# Patient Record
Sex: MALE | Race: WHITE | Employment: OTHER | URBAN - METROPOLITAN AREA
[De-identification: names, ages, dates, MRNs, and addresses within clinical notes are randomized per-mention and may not be internally consistent; named-entity substitution may affect disease eponyms.]

---

## 2017-06-19 ENCOUNTER — OFFICE VISIT (OUTPATIENT)
Dept: DERMATOLOGY CLINIC | Facility: CLINIC | Age: 67
End: 2017-06-19

## 2017-06-19 DIAGNOSIS — L30.4 INTERTRIGO: ICD-10-CM

## 2017-06-19 DIAGNOSIS — D23.60 BENIGN NEOPLASM OF SKIN OF UPPER LIMB, INCLUDING SHOULDER, UNSPECIFIED LATERALITY: ICD-10-CM

## 2017-06-19 DIAGNOSIS — Z87.2 HISTORY OF ACTINIC KERATOSES: ICD-10-CM

## 2017-06-19 DIAGNOSIS — D23.5 BENIGN NEOPLASM OF SKIN OF TRUNK, EXCEPT SCROTUM: ICD-10-CM

## 2017-06-19 DIAGNOSIS — D23.70 BENIGN NEOPLASM OF SKIN OF LOWER LIMB, INCLUDING HIP, UNSPECIFIED LATERALITY: ICD-10-CM

## 2017-06-19 DIAGNOSIS — L81.4 SOLAR LENTIGO: ICD-10-CM

## 2017-06-19 DIAGNOSIS — L82.1 SEBORRHEIC KERATOSES: Primary | ICD-10-CM

## 2017-06-19 DIAGNOSIS — D23.4 BENIGN NEOPLASM OF SCALP AND SKIN OF NECK: ICD-10-CM

## 2017-06-19 DIAGNOSIS — D23.30 BENIGN NEOPLASM OF SKIN OF FACE: ICD-10-CM

## 2017-06-19 DIAGNOSIS — Z86.018 HISTORY OF DYSPLASTIC NEVUS: ICD-10-CM

## 2017-06-19 PROCEDURE — 99213 OFFICE O/P EST LOW 20 MIN: CPT | Performed by: DERMATOLOGY

## 2017-06-19 PROCEDURE — 99212 OFFICE O/P EST SF 10 MIN: CPT | Performed by: DERMATOLOGY

## 2017-06-19 NOTE — PROGRESS NOTES
HPI:     Chief Complaint     Derm Problem        HPI     Derm Problem    Additional comments: concerned about multiple lesions requests full body checj, history of AK       Last edited by Aviva Judge RN on 6/19/2017  8:08 AM. (History)         patient a Past Surgical History    COLONOSCOPY,DIAGNOSTIC  '05    SPECIAL SERVICE OR REPORT      Comment kidney stone removal    COLONOSCOPY         Social History   Marital Status:   Spouse Name: N/A    Years of Education: N/A  Number of Children: N/ hydrocortisone cream to be applied 1 tab the other twice a day as needed only  History of dysplastic nevus-patient will continue yearly follow-up  History of actinic keratoses-patient will continue yearly follow-up  Benign neoplasm of skin of trunk, except

## 2017-06-19 NOTE — PROGRESS NOTES
Past Medical History   Diagnosis Date   • Family history of unspecified malignant neoplasm      prostate   • Duodenal ulcer 1/05   • Cellulitis and abscess of upper arm and forearm      right   • Other and unspecified hyperlipidemia    • Calculus of kidney

## 2018-03-21 RX ORDER — IBUPROFEN 200 MG
200 TABLET ORAL EVERY 6 HOURS PRN
Status: ON HOLD | COMMUNITY
End: 2019-11-10

## 2018-03-27 ENCOUNTER — ANESTHESIA EVENT (OUTPATIENT)
Dept: SURGERY | Facility: HOSPITAL | Age: 68
End: 2018-03-27
Payer: COMMERCIAL

## 2018-03-27 ENCOUNTER — HOSPITAL ENCOUNTER (OUTPATIENT)
Facility: HOSPITAL | Age: 68
Setting detail: HOSPITAL OUTPATIENT SURGERY
Discharge: HOME | End: 2018-03-27
Attending: SURGERY | Admitting: SURGERY
Payer: COMMERCIAL

## 2018-03-27 ENCOUNTER — ANESTHESIA (OUTPATIENT)
Dept: SURGERY | Facility: HOSPITAL | Age: 68
End: 2018-03-27
Payer: COMMERCIAL

## 2018-03-27 ENCOUNTER — SURGERY (OUTPATIENT)
Age: 68
End: 2018-03-27

## 2018-03-27 VITALS
TEMPERATURE: 97 F | OXYGEN SATURATION: 95 % | HEART RATE: 61 BPM | DIASTOLIC BLOOD PRESSURE: 92 MMHG | RESPIRATION RATE: 18 BRPM | SYSTOLIC BLOOD PRESSURE: 144 MMHG | BODY MASS INDEX: 34.12 KG/M2 | WEIGHT: 230.38 LBS | HEIGHT: 69 IN

## 2018-03-27 PROBLEM — I83.899 SYMPTOMATIC VARICOSE VEINS: Status: ACTIVE | Noted: 2018-03-27

## 2018-03-27 PROCEDURE — 06DQ0ZZ EXTRACTION OF LEFT SAPHENOUS VEIN, OPEN APPROACH: ICD-10-PCS | Performed by: SURGERY

## 2018-03-27 PROCEDURE — 06DQ3ZZ EXTRACTION OF LEFT SAPHENOUS VEIN, PERCUTANEOUS APPROACH: ICD-10-PCS | Performed by: SURGERY

## 2018-03-27 RX ORDER — MORPHINE SULFATE 4 MG/ML
4 INJECTION, SOLUTION INTRAMUSCULAR; INTRAVENOUS EVERY 10 MIN PRN
Status: DISCONTINUED | OUTPATIENT
Start: 2018-03-27 | End: 2018-03-27

## 2018-03-27 RX ORDER — LIDOCAINE HYDROCHLORIDE 10 MG/ML
INJECTION, SOLUTION EPIDURAL; INFILTRATION; INTRACAUDAL; PERINEURAL AS NEEDED
Status: DISCONTINUED | OUTPATIENT
Start: 2018-03-27 | End: 2018-03-27 | Stop reason: SURG

## 2018-03-27 RX ORDER — HYDROCODONE BITARTRATE AND ACETAMINOPHEN 5; 325 MG/1; MG/1
1 TABLET ORAL AS NEEDED
Status: DISCONTINUED | OUTPATIENT
Start: 2018-03-27 | End: 2018-03-27

## 2018-03-27 RX ORDER — SODIUM CHLORIDE, SODIUM LACTATE, POTASSIUM CHLORIDE, CALCIUM CHLORIDE 600; 310; 30; 20 MG/100ML; MG/100ML; MG/100ML; MG/100ML
INJECTION, SOLUTION INTRAVENOUS CONTINUOUS
Status: DISCONTINUED | OUTPATIENT
Start: 2018-03-27 | End: 2018-03-27

## 2018-03-27 RX ORDER — ONDANSETRON 2 MG/ML
4 INJECTION INTRAMUSCULAR; INTRAVENOUS EVERY 6 HOURS PRN
Status: CANCELLED | OUTPATIENT
Start: 2018-03-27

## 2018-03-27 RX ORDER — MIDAZOLAM HYDROCHLORIDE 1 MG/ML
INJECTION INTRAMUSCULAR; INTRAVENOUS AS NEEDED
Status: DISCONTINUED | OUTPATIENT
Start: 2018-03-27 | End: 2018-03-27 | Stop reason: SURG

## 2018-03-27 RX ORDER — EPHEDRINE SULFATE 50 MG/ML
INJECTION, SOLUTION INTRAVENOUS AS NEEDED
Status: DISCONTINUED | OUTPATIENT
Start: 2018-03-27 | End: 2018-03-27 | Stop reason: SURG

## 2018-03-27 RX ORDER — METOCLOPRAMIDE 10 MG/1
10 TABLET ORAL ONCE
Status: COMPLETED | OUTPATIENT
Start: 2018-03-27 | End: 2018-03-27

## 2018-03-27 RX ORDER — HYDROCODONE BITARTRATE AND ACETAMINOPHEN 10; 325 MG/1; MG/1
1 TABLET ORAL EVERY 6 HOURS PRN
Qty: 20 TABLET | Refills: 0 | Status: SHIPPED | OUTPATIENT
Start: 2018-03-27 | End: 2018-04-01

## 2018-03-27 RX ORDER — ONDANSETRON 2 MG/ML
4 INJECTION INTRAMUSCULAR; INTRAVENOUS ONCE AS NEEDED
Status: DISCONTINUED | OUTPATIENT
Start: 2018-03-27 | End: 2018-03-27

## 2018-03-27 RX ORDER — MORPHINE SULFATE 10 MG/ML
6 INJECTION, SOLUTION INTRAMUSCULAR; INTRAVENOUS EVERY 10 MIN PRN
Status: DISCONTINUED | OUTPATIENT
Start: 2018-03-27 | End: 2018-03-27

## 2018-03-27 RX ORDER — NALOXONE HYDROCHLORIDE 0.4 MG/ML
80 INJECTION, SOLUTION INTRAMUSCULAR; INTRAVENOUS; SUBCUTANEOUS AS NEEDED
Status: DISCONTINUED | OUTPATIENT
Start: 2018-03-27 | End: 2018-03-27

## 2018-03-27 RX ORDER — BUPIVACAINE HYDROCHLORIDE 2.5 MG/ML
INJECTION, SOLUTION EPIDURAL; INFILTRATION; INTRACAUDAL AS NEEDED
Status: DISCONTINUED | OUTPATIENT
Start: 2018-03-27 | End: 2018-03-27 | Stop reason: HOSPADM

## 2018-03-27 RX ORDER — DEXAMETHASONE SODIUM PHOSPHATE 4 MG/ML
VIAL (ML) INJECTION AS NEEDED
Status: DISCONTINUED | OUTPATIENT
Start: 2018-03-27 | End: 2018-03-27 | Stop reason: SURG

## 2018-03-27 RX ORDER — HYDROCODONE BITARTRATE AND ACETAMINOPHEN 5; 325 MG/1; MG/1
2 TABLET ORAL AS NEEDED
Status: DISCONTINUED | OUTPATIENT
Start: 2018-03-27 | End: 2018-03-27

## 2018-03-27 RX ORDER — HALOPERIDOL 5 MG/ML
0.25 INJECTION INTRAMUSCULAR ONCE AS NEEDED
Status: DISCONTINUED | OUTPATIENT
Start: 2018-03-27 | End: 2018-03-27

## 2018-03-27 RX ORDER — ONDANSETRON 2 MG/ML
INJECTION INTRAMUSCULAR; INTRAVENOUS AS NEEDED
Status: DISCONTINUED | OUTPATIENT
Start: 2018-03-27 | End: 2018-03-27 | Stop reason: SURG

## 2018-03-27 RX ORDER — MORPHINE SULFATE 2 MG/ML
2 INJECTION, SOLUTION INTRAMUSCULAR; INTRAVENOUS EVERY 10 MIN PRN
Status: DISCONTINUED | OUTPATIENT
Start: 2018-03-27 | End: 2018-03-27

## 2018-03-27 RX ORDER — FAMOTIDINE 20 MG/1
20 TABLET ORAL ONCE
Status: COMPLETED | OUTPATIENT
Start: 2018-03-27 | End: 2018-03-27

## 2018-03-27 RX ORDER — ACETAMINOPHEN 500 MG
1000 TABLET ORAL ONCE
Status: COMPLETED | OUTPATIENT
Start: 2018-03-27 | End: 2018-03-27

## 2018-03-27 RX ADMIN — MIDAZOLAM HYDROCHLORIDE 2 MG: 1 INJECTION INTRAMUSCULAR; INTRAVENOUS at 08:05:00

## 2018-03-27 RX ADMIN — ONDANSETRON 4 MG: 2 INJECTION INTRAMUSCULAR; INTRAVENOUS at 08:05:00

## 2018-03-27 RX ADMIN — SODIUM CHLORIDE, SODIUM LACTATE, POTASSIUM CHLORIDE, CALCIUM CHLORIDE: 600; 310; 30; 20 INJECTION, SOLUTION INTRAVENOUS at 08:05:00

## 2018-03-27 RX ADMIN — EPHEDRINE SULFATE 10 MG: 50 INJECTION, SOLUTION INTRAVENOUS at 08:27:00

## 2018-03-27 RX ADMIN — DEXAMETHASONE SODIUM PHOSPHATE 4 MG: 4 MG/ML VIAL (ML) INJECTION at 08:05:00

## 2018-03-27 RX ADMIN — LIDOCAINE HYDROCHLORIDE 50 MG: 10 INJECTION, SOLUTION EPIDURAL; INFILTRATION; INTRACAUDAL; PERINEURAL at 08:05:00

## 2018-03-27 NOTE — OPERATIVE REPORT
[facility]    PATIENT'S NAME: Shanelle Lucero   ATTENDING PHYSICIAN: Jenna Haywood MD   OPERATING PHYSICIAN: Jenna Haywood MD   PATIENT ACCOUNT#:   [de-identified]    LOCATION:  [place] [address]  MEDICAL RECORD #:   [id number]       DATE entire leg was prepped with Betadine. Time-out was done. A 2 cm groin incision was made. The greater saphenous vein was identified at its origin; it was about 13 mm in diameter. It was doubly ligated and short segment resected between ligatures.   The regina removed. The patient brought to the recovery room in stable condition.     Dictated By Skylar Oswald MD  d: 03/27/2018 09:44:00  t: 03/27/2018 10:53:50  [facility] [id number]  [facility]/

## 2018-03-27 NOTE — BRIEF OP NOTE
[facility] POST ANESTHESIA CARE UNIT  Brief Op Note       Patients Name: Elsa Chaudhry  Attending Physician: Agata Deal MD  Operating Physician: Loretta Yanes MD  CSN: [id number]     Location:  OR  MRN: [id number]    YOB: 1950

## 2018-03-27 NOTE — ANESTHESIA POSTPROCEDURE EVALUATION
Patient: Samson Goldmann    Procedure Summary     Date:  03/27/18 Room / Location:  [address] [place] [address] MAIN OR [address] [place] [address] MAIN OR    Anesthesia Start:  0801 Anesthesia Stop:      Procedures:       VEIN LIGATION (Left )      STAB PHLEBECTOMY (Left ) Diagnosis:  (Left leg

## 2018-03-27 NOTE — ANESTHESIA PREPROCEDURE EVALUATION
Anesthesia PreOp Note    HPI:     Vikash Eckert is a 74year old male who presents for preoperative consultation requested by: Yaw Cheney MD    Date of Surgery: 3/27/2018    Procedure(s):  VEIN LIGATION  STAB PHLEBECTOMY  Indication: Left leg Varic MULTIVITAMIN OR daily Disp:  Rfl:  3/23/2018       Current Facility-Administered Medications Ordered in Epic:  lactated ringers infusion  Intravenous Continuous Kae Guerrero MD Last Rate: 20 mL/hr at 03/27/18 0704    Vancomycin HCl (VANCOCIN) 1,500 mg Neuro/Psych - negative ROS     GI/Hepatic/Renal - negative ROS     Comments: Renal calculus    Endo/Other - negative ROS   Abdominal              Anesthesia Plan:   ASA:  2  Plan:   General  Informed Consent Plan and Risks Discussed With:  Patient      I h

## 2018-03-27 NOTE — H&P
History & Physical Examination    Patient Name: Brianne Cisneros  MRN: [id number]  [facility]: [id number]  YOB: 1950    Diagnosis: Left leg Varicose veins with inflammation; venous insufficiency     History Present Illness: Patient is a 74 year ol MG/ML injection 50 mcg 50 mcg Intravenous Q5 Min PRN   morphINE sulfate (PF) 2 MG/ML injection 2 mg 2 mg Intravenous Q10 Min PRN   morphINE sulfate (PF) 4 MG/ML injection 4 mg 4 mg Intravenous Q10 Min PRN   morphINE sulfate (PF) 10 MG/ML injection 6 mg 6 m STRIPPING Right  Family History   Problem Relation Age of Onset   • Cancer Father      cancer, lung -smoker   • Hypertension Father    • Lipids Mother         Smoking status: Never Smoker    Smokeless tobacco: Never Used    Alcohol use No       SYSTEM Chec

## 2018-06-09 ENCOUNTER — APPOINTMENT (OUTPATIENT)
Dept: ULTRASOUND IMAGING | Facility: HOSPITAL | Age: 68
End: 2018-06-09
Attending: EMERGENCY MEDICINE
Payer: COMMERCIAL

## 2018-06-09 ENCOUNTER — HOSPITAL ENCOUNTER (EMERGENCY)
Facility: HOSPITAL | Age: 68
Discharge: HOME | End: 2018-06-09
Attending: EMERGENCY MEDICINE
Payer: COMMERCIAL

## 2018-06-09 VITALS
HEART RATE: 68 BPM | WEIGHT: 231.5 LBS | SYSTOLIC BLOOD PRESSURE: 141 MMHG | TEMPERATURE: 98 F | BODY MASS INDEX: 34.29 KG/M2 | HEIGHT: 69 IN | DIASTOLIC BLOOD PRESSURE: 80 MMHG | RESPIRATION RATE: 18 BRPM | OXYGEN SATURATION: 96 %

## 2018-06-09 DIAGNOSIS — M79.89 LEG SWELLING: ICD-10-CM

## 2018-06-09 DIAGNOSIS — M25.461 KNEE EFFUSION, RIGHT: Primary | ICD-10-CM

## 2018-06-09 PROCEDURE — 93971 EXTREMITY STUDY: CPT | Performed by: EMERGENCY MEDICINE

## 2018-06-09 PROCEDURE — 99284 EMERGENCY DEPT VISIT MOD MDM: CPT

## 2018-06-09 NOTE — ED PROVIDER NOTES
Patient Seen in: [facility] AND [facility] Emergency Department    History   Patient presents with:  Swelling Edema (cardiovascular, metabolic)      HPI    Patient presents to the ED complaining of swelling of his right lower leg.   He states that several days ag No  Reaction to local anes* No        ROS  Pertinent Positives: Leg swelling, knee swelling  All other organ systems are reviewed and are negative. Constitutional and vital signs reviewed.       Social History and Family History elements reviewed from to and interpreted all ED vitals.     Pulse Ox: 96%, Room air, Normal     Differential Diagnosis/ Diagnostic Considerations: DVT, knee effusion, knee sprain, peripheral edema    Medical Record Review: I personally reviewed available prior medical records for a

## 2018-06-09 NOTE — ED INITIAL ASSESSMENT (HPI)
Pt reports to ED with complaints of RLE swelling. Pt has bilat lower extremity pitting edema but more severe on RLE. Some swelling noted in R lower thigh above knee. Pt denies any pain or SOB.

## 2018-06-18 ENCOUNTER — OFFICE VISIT (OUTPATIENT)
Dept: DERMATOLOGY CLINIC | Facility: CLINIC | Age: 68
End: 2018-06-18

## 2018-06-18 ENCOUNTER — APPOINTMENT (OUTPATIENT)
Dept: LAB | Age: 68
End: 2018-06-18
Attending: DERMATOLOGY
Payer: COMMERCIAL

## 2018-06-18 DIAGNOSIS — D23.70 BENIGN NEOPLASM OF SKIN OF LOWER EXTREMITY, INCLUDING HIP, UNSPECIFIED LATERALITY: ICD-10-CM

## 2018-06-18 DIAGNOSIS — D23.60 BENIGN NEOPLASM OF SKIN OF UPPER EXTREMITY AND SHOULDER, UNSPECIFIED LATERALITY: ICD-10-CM

## 2018-06-18 DIAGNOSIS — L30.4 INTERTRIGO: ICD-10-CM

## 2018-06-18 DIAGNOSIS — D48.5 NEOPLASM OF UNCERTAIN BEHAVIOR OF SKIN: ICD-10-CM

## 2018-06-18 DIAGNOSIS — L30.9 DERMATITIS: Primary | ICD-10-CM

## 2018-06-18 DIAGNOSIS — D23.5 BENIGN NEOPLASM OF SKIN OF TRUNK, EXCEPT SCROTUM: ICD-10-CM

## 2018-06-18 DIAGNOSIS — Z86.018 HISTORY OF DYSPLASTIC NEVUS: ICD-10-CM

## 2018-06-18 DIAGNOSIS — D23.4 BENIGN NEOPLASM OF SCALP AND SKIN OF NECK: ICD-10-CM

## 2018-06-18 DIAGNOSIS — D23.30 BENIGN NEOPLASM OF SKIN OF FACE: ICD-10-CM

## 2018-06-18 PROCEDURE — 99213 OFFICE O/P EST LOW 20 MIN: CPT | Performed by: DERMATOLOGY

## 2018-06-18 PROCEDURE — 88305 TISSUE EXAM BY PATHOLOGIST: CPT | Performed by: DERMATOLOGY

## 2018-06-18 PROCEDURE — 36415 COLL VENOUS BLD VENIPUNCTURE: CPT | Performed by: DERMATOLOGY

## 2018-06-18 PROCEDURE — 86038 ANTINUCLEAR ANTIBODIES: CPT | Performed by: DERMATOLOGY

## 2018-06-18 PROCEDURE — 88342 IMHCHEM/IMCYTCHM 1ST ANTB: CPT | Performed by: DERMATOLOGY

## 2018-06-18 PROCEDURE — 11100 BIOPSY OF SKIN LESION: CPT | Performed by: DERMATOLOGY

## 2018-06-18 PROCEDURE — 99212 OFFICE O/P EST SF 10 MIN: CPT | Performed by: DERMATOLOGY

## 2018-06-18 RX ORDER — FLUOCINONIDE 0.5 MG/G
OINTMENT TOPICAL
Qty: 60 G | Refills: 3 | Status: SHIPPED | OUTPATIENT
Start: 2018-06-18 | End: 2018-07-24

## 2018-06-18 NOTE — PROGRESS NOTES
HPI:     Chief Complaint     Full Skin Exam        HPI     Full Skin Exam    Additional comments: LOV: 06/19/17. Pt presents for a full skin exam. Pt has personal hx of AK and Dysplastic Nevus.         Last edited by Deandre Soto on 6/18/2018  8:06 AM 2002    per [employer]: kidney stone removed   • Cellulitis and abscess of upper arm and forearm     right   • Duodenal ulcer 1/05   • Dysplastic nevus 2016    lumbosacral back, Compound Dysplastic Nevus-  mild dysplasia   • Family history of unspecified Carmen Selwyn macules on face, trunk and extremities  - there are no active pustular vesicular lesions.   There is some erythema and scaling primarily around the right hand around the DIP and PIP joints  - there are some cherry red papules  - there are numerous brown digna this encounter.         6/18/2018  Morgan Doran

## 2018-06-18 NOTE — PROGRESS NOTES
Past Medical History:   Diagnosis Date   • Calculus of kidney 2002    per [employer]: kidney stone removed   • Cellulitis and abscess of upper arm and forearm     right   • Duodenal ulcer 1/05   • Dysplastic nevus 2016    lumbosacral back, Compound Dysplastic

## 2018-07-24 ENCOUNTER — OFFICE VISIT (OUTPATIENT)
Dept: DERMATOLOGY CLINIC | Facility: CLINIC | Age: 68
End: 2018-07-24
Payer: COMMERCIAL

## 2018-07-24 DIAGNOSIS — L30.9 DERMATITIS: ICD-10-CM

## 2018-07-24 DIAGNOSIS — D23.5 DYSPLASTIC NEVUS OF TRUNK: Primary | ICD-10-CM

## 2018-07-24 PROCEDURE — 11402 EXC TR-EXT B9+MARG 1.1-2 CM: CPT | Performed by: DERMATOLOGY

## 2018-07-24 PROCEDURE — 12032 INTMD RPR S/A/T/EXT 2.6-7.5: CPT | Performed by: DERMATOLOGY

## 2018-07-24 PROCEDURE — 88305 TISSUE EXAM BY PATHOLOGIST: CPT | Performed by: DERMATOLOGY

## 2018-07-24 RX ORDER — TACROLIMUS 1 MG/G
1 OINTMENT TOPICAL 2 TIMES DAILY
Qty: 60 G | Refills: 3 | Status: SHIPPED | OUTPATIENT
Start: 2018-07-24 | End: 2018-09-06

## 2018-07-24 NOTE — PROGRESS NOTES
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

## 2018-07-24 NOTE — PROCEDURES
Procedural Report for Elliptical Excision    Procedure: With the patient is a supine position, the skin was scrubbed with alcohol. Field block anesthesia was obtained by injecting 2 mL of 1% Xylocaine with Epinephrine.   A fusiform excision whose total

## 2018-07-24 NOTE — PROGRESS NOTES
HPI:     Chief Complaint     Derm Problem        HPI     Derm Problem    Additional comments: LOV: 06/18/18. Pt presents for re-excision of Atypical Nevus on mid chest. Pt also c/o \"possible fungus\" on hands and palms.         Last edited by Sonali Heard Atorvastatin Calcium (LIPITOR) 40 MG Oral Tab Take 20 mg by mouth nightly.    Disp:  Rfl:    MULTIVITAMIN OR daily Disp:  Rfl:      Allergies:     Penicillins               Sulfa Antibiotics           Past Medical History:   Diagnosis Date   • Atypical ne diagnosis)-elliptical excision is performed. See the operative note. All consents were signed. All postop instructions given. Patient tolerated procedure well.   Patient is to follow up in one week for suture removal.  Patient will call if  any interim

## 2018-07-31 ENCOUNTER — OFFICE VISIT (OUTPATIENT)
Dept: DERMATOLOGY CLINIC | Facility: CLINIC | Age: 68
End: 2018-07-31
Payer: COMMERCIAL

## 2018-07-31 DIAGNOSIS — L30.9 HAND DERMATITIS: Primary | ICD-10-CM

## 2018-07-31 PROCEDURE — 95044 PATCH/APPLICATION TESTS: CPT | Performed by: DERMATOLOGY

## 2018-07-31 NOTE — PROGRESS NOTES
HPI:     Chief Complaint     Derm Problem        HPI     Derm Problem    Additional comments: Pt presents today for suture removal        Last edited by Deandre Camara on 7/31/2018  2:53 PM. (History)          Patient is also here for application of pa cholesterol    • Osteoarthritis    • Other and unspecified hyperlipidemia    • Visual impairment      Past Surgical History:  No date: COLONOSCOPY  '05: COLONOSCOPY,DIAGNOSTIC  No date: CYSTOSCOPY,INSERT URETERAL STENT  No date: SPECIAL SERVICE OR REPORT defined types were placed in this encounter.         7/31/2018  [facility]

## 2018-08-02 ENCOUNTER — NURSE ONLY (OUTPATIENT)
Dept: DERMATOLOGY CLINIC | Facility: CLINIC | Age: 68
End: 2018-08-02
Payer: COMMERCIAL

## 2018-08-02 DIAGNOSIS — L25.9 CONTACT DERMATITIS, UNSPECIFIED CONTACT DERMATITIS TYPE, UNSPECIFIED TRIGGER: Primary | ICD-10-CM

## 2018-08-02 PROCEDURE — 99212 OFFICE O/P EST SF 10 MIN: CPT | Performed by: DERMATOLOGY

## 2018-08-04 ENCOUNTER — OFFICE VISIT (OUTPATIENT)
Dept: DERMATOLOGY CLINIC | Facility: CLINIC | Age: 68
End: 2018-08-04
Payer: COMMERCIAL

## 2018-08-04 DIAGNOSIS — L30.9 HAND DERMATITIS: Primary | ICD-10-CM

## 2018-08-04 PROCEDURE — 88312 SPECIAL STAINS GROUP 1: CPT | Performed by: DERMATOLOGY

## 2018-08-04 PROCEDURE — 88305 TISSUE EXAM BY PATHOLOGIST: CPT | Performed by: DERMATOLOGY

## 2018-08-04 PROCEDURE — 99213 OFFICE O/P EST LOW 20 MIN: CPT | Performed by: DERMATOLOGY

## 2018-08-04 PROCEDURE — 11100 BIOPSY OF SKIN LESION: CPT | Performed by: DERMATOLOGY

## 2018-08-04 PROCEDURE — 99212 OFFICE O/P EST SF 10 MIN: CPT | Performed by: DERMATOLOGY

## 2018-08-04 RX ORDER — CLOBETASOL PROPIONATE 0.5 MG/G
1 OINTMENT TOPICAL DAILY PRN
Qty: 30 G | Refills: 2 | Status: SHIPPED | OUTPATIENT
Start: 2018-08-04 | End: 2021-06-14

## 2018-08-04 NOTE — PROGRESS NOTES
HPI:     Chief Complaint     Derm Problem        HPI     Derm Problem    Additional comments: LOV: 08/02/18. Pt presents for final reading of True Test patch application.         Last edited by Deandre Dean on 8/4/2018 10:55 AM. (History)          Zachary Nieves + 2.5% hydrocortisone cream (1:1)Apply to affected areas 2x/day as needed Disp: 60 g Rfl: 3   Atorvastatin Calcium (LIPITOR) 40 MG Oral Tab Take 20 mg by mouth nightly.    Disp:  Rfl:    MULTIVITAMIN OR daily Disp:  Rfl:      Allergies:     Penicillins aspect particularly by the DIP and PIP joints. No vesicles. One erythematous crusty patch on right palm. Right hand more involved than the left.       ASSESSMENT/PLAN:   Hand dermatitis  (primary encounter diagnosis)-I have told patient that the negative

## 2018-08-11 NOTE — PROGRESS NOTES
Spoke to pt's wife who is a nurse to relay bx results and LSS message. She voiced understanding. Result logged and checked off in path book.

## 2018-09-06 ENCOUNTER — OFFICE VISIT (OUTPATIENT)
Dept: DERMATOLOGY CLINIC | Facility: CLINIC | Age: 68
End: 2018-09-06
Payer: COMMERCIAL

## 2018-09-06 DIAGNOSIS — L30.9 HAND DERMATITIS: Primary | ICD-10-CM

## 2018-09-06 PROCEDURE — 99212 OFFICE O/P EST SF 10 MIN: CPT | Performed by: DERMATOLOGY

## 2018-09-06 PROCEDURE — 99213 OFFICE O/P EST LOW 20 MIN: CPT | Performed by: DERMATOLOGY

## 2018-09-06 RX ORDER — UREA 40 %
1 CREAM (GRAM) TOPICAL 2 TIMES DAILY
Qty: 1 TUBE | Refills: 3 | Status: ON HOLD | OUTPATIENT
Start: 2018-09-06 | End: 2019-11-08

## 2018-09-06 NOTE — PROGRESS NOTES
HPI:     Chief Complaint     Eczema        HPI     Eczema    Additional comments: LOV: 08/04/18. Pt presents with f/u to spongiotic dermatitis, biopsy proven, pt states condition is improving, per pt, \"less angry but spreading to other places\".   Pt curre every 6 (six) hours as needed for Pain. Disp:  Rfl:    Cholecalciferol (VITAMIN D) 1000 units Oral Tab Take by mouth daily.  Disp:  Rfl:    CUSTOM MEDICATION Econazole cr + 2.5% hydrocortisone cream (1:1)Apply to affected areas 2x/day as needed Disp: 60 g R There is some eczematous change and dermatitis along the lateral aspect of several fingers of the hands bilaterally. Also small little erythematous papules most market around DIP and PIP joints. Patient does pick at these.       ASSESSMENT/PLAN:   Hand de

## 2018-10-25 ENCOUNTER — OFFICE VISIT (OUTPATIENT)
Dept: DERMATOLOGY CLINIC | Facility: CLINIC | Age: 68
End: 2018-10-25
Payer: COMMERCIAL

## 2018-10-25 DIAGNOSIS — L30.9 HAND DERMATITIS: Primary | ICD-10-CM

## 2018-10-25 PROCEDURE — 99212 OFFICE O/P EST SF 10 MIN: CPT | Performed by: DERMATOLOGY

## 2018-10-25 PROCEDURE — 99213 OFFICE O/P EST LOW 20 MIN: CPT | Performed by: DERMATOLOGY

## 2018-10-25 RX ORDER — BETAMETHASONE DIPROPIONATE 0.5 MG/G
OINTMENT TOPICAL
Qty: 45 G | Refills: 3 | Status: ON HOLD | OUTPATIENT
Start: 2018-10-25 | End: 2019-11-08

## 2018-10-25 NOTE — PROGRESS NOTES
Past Medical History:   Diagnosis Date   • Atypical nevus 2018    Mild to moderate   • Calculus of kidney 2002    per [employer]: kidney stone removed   • Cellulitis and abscess of upper arm and forearm     right   • Duodenal ulcer 1/05   • Dysplastic nevus 2 Problem Relation Age of Onset   • Cancer Father         cancer, lung -smoker   • Hypertension Father    • Lipids Mother          Penicillins               Sulfa Antibiotics

## 2018-10-25 NOTE — PROGRESS NOTES
HPI:     Chief Complaint     Eczema        HPI     Eczema      Additional comments: LOV: 09/06/18.  Pt presents with f/u to hand eczema, pt states condition is worsening, currently only using Neutrogena since insurance denies payment of Clobetasol/Mupirocin Apply to afected area hands 2x/day Disp: 15 g Rfl: 3   Econazole Nitrate 1 % External Cream Apply 2x/day along  with the hydrocortisone, as needed Disp: 30 g Rfl: 5   hydrocortisone 2.5 % External Cream Apply 1 Application topically 2 (two) times daily.  Elizabeth Morel Sexual activity: Not on file    Other Topics      Concerns:        Grew up on a farm: Not Asked        History of tanning: Not Asked        Outdoor occupation: Not Asked        Pt has a pacemaker: No        Pt has a defibrillator: Not Asked        Reaction

## 2018-12-27 ENCOUNTER — OFFICE VISIT (OUTPATIENT)
Dept: DERMATOLOGY CLINIC | Facility: CLINIC | Age: 68
End: 2018-12-27
Payer: COMMERCIAL

## 2018-12-27 DIAGNOSIS — D23.4 BENIGN NEOPLASM OF SCALP AND SKIN OF NECK: ICD-10-CM

## 2018-12-27 DIAGNOSIS — D23.5 BENIGN NEOPLASM OF SKIN OF TRUNK, EXCEPT SCROTUM: ICD-10-CM

## 2018-12-27 DIAGNOSIS — L82.1 SEBORRHEIC KERATOSES: ICD-10-CM

## 2018-12-27 DIAGNOSIS — Z87.2 HISTORY OF ACTINIC KERATOSES: ICD-10-CM

## 2018-12-27 DIAGNOSIS — D23.30 BENIGN NEOPLASM OF SKIN OF FACE: ICD-10-CM

## 2018-12-27 DIAGNOSIS — L30.9 HAND DERMATITIS: Primary | ICD-10-CM

## 2018-12-27 DIAGNOSIS — L81.4 SOLAR LENTIGO: ICD-10-CM

## 2018-12-27 DIAGNOSIS — D23.60 BENIGN NEOPLASM OF SKIN OF UPPER EXTREMITY AND SHOULDER, UNSPECIFIED LATERALITY: ICD-10-CM

## 2018-12-27 DIAGNOSIS — Z86.018 HISTORY OF DYSPLASTIC NEVUS: ICD-10-CM

## 2018-12-27 PROCEDURE — 99212 OFFICE O/P EST SF 10 MIN: CPT | Performed by: DERMATOLOGY

## 2018-12-27 PROCEDURE — 99213 OFFICE O/P EST LOW 20 MIN: CPT | Performed by: DERMATOLOGY

## 2018-12-27 NOTE — PROGRESS NOTES
HPI:     Chief Complaint     Upper Body Exam        HPI     Upper Body Exam      Additional comments: LOV 10/25/2018  Patient presenting with lesion of concern , Patient requesting upper body skin exam      Patient has hx of Atypical nevus and Dysplastic n 2.5 % External Cream Apply 1 Application topically 2 (two) times daily. Disp: 30 g Rfl: 5   ibuprofen 200 MG Oral Tab Take 200 mg by mouth every 6 (six) hours as needed for Pain.  Disp:  Rfl:    CUSTOM MEDICATION Econazole cr + 2.5% hydrocortisone cream (1: Alcohol/week: 0.0 oz      Drug use: No      Sexual activity: Not on file    Other Topics      Concerns:        Grew up on a farm: Not Asked        History of tanning: Not Asked        Outdoor occupation: Not Asked        Pt has a pacemaker: No        Pt ha to follow up in 6 months. The patient will come sooner should they note  anything new or changing.   Proper sunblock use  of SPF 30 or higher, hats, discussed  Benign neoplasm of skin of face  Benign neoplasm of skin of upper extremity and shoulder, unspec

## 2019-06-17 ENCOUNTER — OFFICE VISIT (OUTPATIENT)
Dept: DERMATOLOGY CLINIC | Facility: CLINIC | Age: 69
End: 2019-06-17
Payer: COMMERCIAL

## 2019-06-17 DIAGNOSIS — L30.9 HAND DERMATITIS: Primary | ICD-10-CM

## 2019-06-17 DIAGNOSIS — D23.30 BENIGN NEOPLASM OF SKIN OF FACE: ICD-10-CM

## 2019-06-17 DIAGNOSIS — Z87.2 HISTORY OF ACTINIC KERATOSES: ICD-10-CM

## 2019-06-17 DIAGNOSIS — D23.60 BENIGN NEOPLASM OF SKIN OF UPPER EXTREMITY AND SHOULDER, UNSPECIFIED LATERALITY: ICD-10-CM

## 2019-06-17 DIAGNOSIS — D23.5 BENIGN NEOPLASM OF SKIN OF TRUNK, EXCEPT SCROTUM: ICD-10-CM

## 2019-06-17 DIAGNOSIS — L81.4 SOLAR LENTIGO: ICD-10-CM

## 2019-06-17 DIAGNOSIS — D23.70 BENIGN NEOPLASM OF SKIN OF LOWER EXTREMITY, INCLUDING HIP, UNSPECIFIED LATERALITY: ICD-10-CM

## 2019-06-17 DIAGNOSIS — D23.4 BENIGN NEOPLASM OF SCALP AND SKIN OF NECK: ICD-10-CM

## 2019-06-17 DIAGNOSIS — Z86.018 HISTORY OF DYSPLASTIC NEVUS: ICD-10-CM

## 2019-06-17 DIAGNOSIS — L82.1 SEBORRHEIC KERATOSES: ICD-10-CM

## 2019-06-17 PROCEDURE — 99213 OFFICE O/P EST LOW 20 MIN: CPT | Performed by: DERMATOLOGY

## 2019-06-17 PROCEDURE — 99212 OFFICE O/P EST SF 10 MIN: CPT | Performed by: DERMATOLOGY

## 2019-06-17 RX ORDER — LOSARTAN POTASSIUM 50 MG/1
50 TABLET ORAL
COMMUNITY
Start: 2019-03-20 | End: 2019-06-18

## 2019-06-17 NOTE — PROGRESS NOTES
HPI:     Chief Complaint     Full Skin Exam; Lesion; Dermatitis        HPI     Full Skin Exam      Additional comments: LOV 12/27/2018.  Pt presenting for yearly full body skin exam. Pt has a personal hx of Dysplastic nevus with mild dysplasia to lumbosacra mouth daily. Disp:  Rfl:    Atorvastatin Calcium (LIPITOR) 40 MG Oral Tab Take 20 mg by mouth nightly.    Disp:  Rfl:    MULTIVITAMIN OR daily Disp:  Rfl:    Betamethasone Dipropionate Aug 0.05 % External Ointment Apply to hands daily Disp: 45 g Rfl: 3   Ur STRIPPING Right      Social History    Socioeconomic History      Marital status:       Spouse name: Not on file      Number of children: Not on file      Years of education: Not on file      Highest education level: Not on file    Occupational Hist extremity, l upper extremity, buttocks, genital area, l lower extremity and right lower extremity. Also exam of scalp  The patient is alert, oriented, and appears their stated age.   Patient is well nourished and in no distress    The exam was remarkable f extremity, including hip, unspecified laterality    No orders of the defined types were placed in this encounter. Results From Past 48 Hours:  No results found for this or any previous visit (from the past 48 hour(s)).     Meds This Visit:      Imaging

## 2019-08-13 ENCOUNTER — OFFICE VISIT (OUTPATIENT)
Dept: DERMATOLOGY CLINIC | Facility: CLINIC | Age: 69
End: 2019-08-13
Payer: COMMERCIAL

## 2019-08-13 DIAGNOSIS — L30.9 HAND DERMATITIS: Primary | ICD-10-CM

## 2019-08-13 PROCEDURE — 99213 OFFICE O/P EST LOW 20 MIN: CPT | Performed by: DERMATOLOGY

## 2019-08-13 NOTE — PROGRESS NOTES
HPI:     Chief Complaint     Derm Problem        HPI     Derm Problem      Additional comments: LOV 6/17/2019 Patient present to f/u on bilateral hands .  Patient states he has notice major improvement           Last edited by Crystal Santos, Deandre Emmanuel on 8/13/2 hydrocortisone cream (1:1)Apply to affected areas 2x/day as needed Disp: 60 g Rfl: 3   Atorvastatin Calcium (LIPITOR) 40 MG Oral Tab Take 20 mg by mouth nightly.    Disp:  Rfl:    MULTIVITAMIN OR daily Disp:  Rfl:      Allergies:     Penicillins Physical activity:        Days per week: Not on file        Minutes per session: Not on file      Stress: Not on file    Relationships      Social connections:        Talks on phone: Not on file        Gets together: Not on file        Attends Sikhism se defined types were placed in this encounter. Results From Past 48 Hours:  No results found for this or any previous visit (from the past 48 hour(s)).     Meds This Visit:      Imaging Orders:  None     Referral Orders:  No orders of the defined types w

## 2019-11-08 ENCOUNTER — APPOINTMENT (OUTPATIENT)
Dept: CT IMAGING | Facility: HOSPITAL | Age: 69
End: 2019-11-08
Attending: EMERGENCY MEDICINE
Payer: MEDICARE

## 2019-11-08 ENCOUNTER — HOSPITAL ENCOUNTER (OUTPATIENT)
Facility: HOSPITAL | Age: 69
Setting detail: OBSERVATION
Discharge: HOME | End: 2019-11-10
Attending: EMERGENCY MEDICINE | Admitting: HOSPITALIST
Payer: MEDICARE

## 2019-11-08 DIAGNOSIS — D64.9 ANEMIA, UNSPECIFIED TYPE: ICD-10-CM

## 2019-11-08 DIAGNOSIS — K92.2 ACUTE GI BLEEDING: Primary | ICD-10-CM

## 2019-11-08 PROCEDURE — 74177 CT ABD & PELVIS W/CONTRAST: CPT | Performed by: EMERGENCY MEDICINE

## 2019-11-08 RX ORDER — LOSARTAN POTASSIUM 50 MG/1
50 TABLET ORAL DAILY
COMMUNITY

## 2019-11-08 RX ORDER — SODIUM CHLORIDE 9 MG/ML
INJECTION, SOLUTION INTRAVENOUS CONTINUOUS
Status: ACTIVE | OUTPATIENT
Start: 2019-11-08 | End: 2019-11-09

## 2019-11-08 RX ORDER — ASCORBIC ACID 500 MG
500 TABLET ORAL DAILY
COMMUNITY

## 2019-11-09 ENCOUNTER — ANESTHESIA EVENT (OUTPATIENT)
Dept: ENDOSCOPY | Facility: HOSPITAL | Age: 69
End: 2019-11-09
Payer: MEDICARE

## 2019-11-09 ENCOUNTER — ANESTHESIA (OUTPATIENT)
Dept: ENDOSCOPY | Facility: HOSPITAL | Age: 69
End: 2019-11-09
Payer: MEDICARE

## 2019-11-09 PROCEDURE — 99219 INITIAL OBSERVATION CARE,LEVL II: CPT | Performed by: HOSPITALIST

## 2019-11-09 PROCEDURE — 0DJ08ZZ INSPECTION OF UPPER INTESTINAL TRACT, VIA NATURAL OR ARTIFICIAL OPENING ENDOSCOPIC: ICD-10-PCS | Performed by: INTERNAL MEDICINE

## 2019-11-09 RX ORDER — ONDANSETRON 2 MG/ML
4 INJECTION INTRAMUSCULAR; INTRAVENOUS EVERY 6 HOURS PRN
Status: DISCONTINUED | OUTPATIENT
Start: 2019-11-09 | End: 2019-11-10

## 2019-11-09 RX ORDER — PANTOPRAZOLE SODIUM 40 MG/1
40 TABLET, DELAYED RELEASE ORAL EVERY 12 HOURS
Status: DISCONTINUED | OUTPATIENT
Start: 2019-11-09 | End: 2019-11-10

## 2019-11-09 RX ORDER — MIDAZOLAM HYDROCHLORIDE 1 MG/ML
INJECTION INTRAMUSCULAR; INTRAVENOUS AS NEEDED
Status: DISCONTINUED | OUTPATIENT
Start: 2019-11-09 | End: 2019-11-09 | Stop reason: SURG

## 2019-11-09 RX ORDER — SODIUM CHLORIDE 9 MG/ML
INJECTION, SOLUTION INTRAVENOUS CONTINUOUS
Status: DISCONTINUED | OUTPATIENT
Start: 2019-11-09 | End: 2019-11-10

## 2019-11-09 RX ORDER — NALOXONE HYDROCHLORIDE 0.4 MG/ML
80 INJECTION, SOLUTION INTRAMUSCULAR; INTRAVENOUS; SUBCUTANEOUS AS NEEDED
Status: DISCONTINUED | OUTPATIENT
Start: 2019-11-09 | End: 2019-11-09 | Stop reason: HOSPADM

## 2019-11-09 RX ORDER — DOXEPIN HYDROCHLORIDE 50 MG/1
1 CAPSULE ORAL DAILY
Status: DISCONTINUED | OUTPATIENT
Start: 2019-11-10 | End: 2019-11-10

## 2019-11-09 RX ORDER — ATORVASTATIN CALCIUM 20 MG/1
20 TABLET, FILM COATED ORAL NIGHTLY
Status: DISCONTINUED | OUTPATIENT
Start: 2019-11-09 | End: 2019-11-10

## 2019-11-09 RX ORDER — NITROGLYCERIN 0.4 MG/1
0.4 TABLET SUBLINGUAL EVERY 5 MIN PRN
Status: DISCONTINUED | OUTPATIENT
Start: 2019-11-09 | End: 2019-11-10

## 2019-11-09 RX ORDER — SODIUM CHLORIDE, SODIUM LACTATE, POTASSIUM CHLORIDE, CALCIUM CHLORIDE 600; 310; 30; 20 MG/100ML; MG/100ML; MG/100ML; MG/100ML
INJECTION, SOLUTION INTRAVENOUS CONTINUOUS
Status: DISCONTINUED | OUTPATIENT
Start: 2019-11-09 | End: 2019-11-10

## 2019-11-09 RX ORDER — CLOBETASOL PROPIONATE 0.5 MG/G
1 OINTMENT TOPICAL DAILY PRN
Status: DISCONTINUED | OUTPATIENT
Start: 2019-11-09 | End: 2019-11-10

## 2019-11-09 RX ORDER — SODIUM CHLORIDE, SODIUM LACTATE, POTASSIUM CHLORIDE, CALCIUM CHLORIDE 600; 310; 30; 20 MG/100ML; MG/100ML; MG/100ML; MG/100ML
INJECTION, SOLUTION INTRAVENOUS CONTINUOUS PRN
Status: DISCONTINUED | OUTPATIENT
Start: 2019-11-09 | End: 2019-11-09 | Stop reason: SURG

## 2019-11-09 RX ORDER — LIDOCAINE HYDROCHLORIDE 10 MG/ML
INJECTION, SOLUTION EPIDURAL; INFILTRATION; INTRACAUDAL; PERINEURAL AS NEEDED
Status: DISCONTINUED | OUTPATIENT
Start: 2019-11-09 | End: 2019-11-09 | Stop reason: SURG

## 2019-11-09 RX ADMIN — MIDAZOLAM HYDROCHLORIDE 2 MG: 1 INJECTION INTRAMUSCULAR; INTRAVENOUS at 01:14:00

## 2019-11-09 RX ADMIN — LIDOCAINE HYDROCHLORIDE 50 MG: 10 INJECTION, SOLUTION EPIDURAL; INFILTRATION; INTRACAUDAL; PERINEURAL at 01:14:00

## 2019-11-09 RX ADMIN — SODIUM CHLORIDE, SODIUM LACTATE, POTASSIUM CHLORIDE, CALCIUM CHLORIDE: 600; 310; 30; 20 INJECTION, SOLUTION INTRAVENOUS at 01:10:00

## 2019-11-09 RX ADMIN — SODIUM CHLORIDE, SODIUM LACTATE, POTASSIUM CHLORIDE, CALCIUM CHLORIDE: 600; 310; 30; 20 INJECTION, SOLUTION INTRAVENOUS at 01:28:00

## 2019-11-09 NOTE — ED PROVIDER NOTES
Patient Seen in: [facility] AND [facility] Emergency Department      History   Patient presents with:  GI Bleeding (gastrointestinal)    Stated Complaint: rectal bleeding    HPI    The patient is a 56-year-old male who presents with generalized abdominal pain fo above.    Physical Exam     ED Triage Vitals [11/08/19 1850]   /81   Pulse 113   Resp 18   Temp 98 °F (36.7 °C)   Temp src Temporal   SpO2 99 %   O2 Device None (Room air)       Current:/76   Pulse 90   Temp 98 °F (36.7 °C) (Temporal)   Resp 16 following components:       Result Value    Glucose 121 (*)     BUN 44 (*)     BUN/CREA Ratio 59.5 (*)     Calculated Osmolality 306 (*)     All other components within normal limits   CBC W/ DIFFERENTIAL - Abnormal; Notable for the following components: bleed.  This involved direct patient intervention, complex decision making, and/or extensive discussions with the patient, family, and clinical staff.           MDM     Pulse Ox: 98%, Normal, room air    Cardiac Monitor: Pulse Readings from Last 1 Encounter pressure.     Medications Prescribed:  Current Discharge Medication List                   Present on Admission  Date Reviewed: 8/13/2019          ICD-10-CM Noted POA    Acute GI bleeding K92.2 11/8/2019 Unknown

## 2019-11-09 NOTE — PROGRESS NOTES
ADMISSION NOTE  Late entry patient seen earlier today after his egd    81year old male with /o PU, on NSAIDS presents with   BRBPR on several occasions with abdominal pain and weakness,  Found to have bleeding gastric ulcer which was cauterized.  Available

## 2019-11-09 NOTE — PROGRESS NOTES
Admission and skin note:    Report received from [employer], [place]. Medications, labs, plan of care reviewed. All belongings with patient. No further questions or concerns. Wife at bedside. Wife is cooperative and supportive.  Patient oriented to room, call light, a

## 2019-11-09 NOTE — OPERATIVE REPORT
ENDOSCOPY OPERATIVE REPORT    Patient Name: Gavin Madrigal  Medical Record #: [id number]  YOB: 1950  Date of Procedure: 11/9/2019    Preoperative Diagnosis: GI-bleeding; history of PUD; history of NSAID use.    Postoperative Diagnosis: several area of the ulcer. A gold probe was used to achieve hemostasis. The procedure was tolerated well and upon completion and throughtout the vital signs were stable. COMPLICATIONS: None.   PLAN: 1.) cont PPI IV   2.) NPO tonight    3.) recommmend repea

## 2019-11-09 NOTE — PROGRESS NOTES
Pt seen and examined. See H and P from Dr Ever Rodas. Follow Hb. Transfuse if less then 7.      Hafsa Thompson DO   11/9/2019

## 2019-11-09 NOTE — ANESTHESIA PREPROCEDURE EVALUATION
Anesthesia PreOp Note    HPI:     Julián Sanchez is a 81year old male who presents for preoperative consultation requested by: Tigre Major MD    Date of Surgery: 11/8/2019 - 11/9/2019    Procedure(s):  ESOPHAGOGASTRODUODENOSCOPY (EGD)  Indication: GI Performed by Tobin Simental MD at [address] [place] [address] MAIN OR   • VEIN LIGATION Left 3/27/2018    Performed by Tobin Simental MD at [address] [place] [address] MAIN OR   • VEIN LIGATION AND STRIPPING Right        Vitamin C 500 MG Oral Tab, Take 500 mg by mouth daily. , Disp: , Rfl:   losartan Pot on file.         Penicillins               Sulfa Antibiotics           Family History   Problem Relation Age of Onset   • Cancer Father         cancer, lung -smoker   • Hypertension Father    • Lipids Mother      Social History    Socioeconomic History WBC 10.5 11/08/2019    RBC 3.37 (L) 11/08/2019    HGB 10.3 (L) 11/08/2019    HCT 31.0 (L) 11/08/2019    MCV 92.0 11/08/2019    MCH 30.6 11/08/2019    MCHC 33.2 11/08/2019    RDW 13.2 11/08/2019    .0 11/08/2019     Lab Results   Component Value D

## 2019-11-09 NOTE — PLAN OF CARE
Patient Aox4. RA and NSR with occasional PVCs. Patient denies abdominal pain at this time. Patient having frequent gas but no bowel movements except in ED. H&H Q6H. EGD performed by Dr. Shwetha Lu. NPO currently.  Uses urinal with no claim of hematuria at this corrine relaxation techniques  - Monitor for opioid side effects  - Notify MD/LIP if interventions unsuccessful or patient reports new pain  - Anticipate increased pain with activity and pre-medicate as appropriate  Outcome: Progressing     Problem: SAFETY ADULT - Nasogastric tube to low intermittent suction as ordered  - Evaluate effectiveness of ordered antiemetic medications  - Provide nonpharmacologic comfort measures as appropriate  - Advance diet as tolerated, if ordered  - Obtain nutritional consult as needed blood products/factors, fluids and medications as ordered and appropriate  - Administer supportive blood products/factors as ordered and appropriate  Outcome: Progressing

## 2019-11-09 NOTE — H&P
[facility]    PATIENT'S NAME: Oscar Swartz   ATTENDING PHYSICIAN: Phillip Caldwell DO   PATIENT ACCOUNT#:   [de-identified]    LOCATION:  [address] [place] [address] Avenue #:   [id number]       YOB: 1950  ADMISSION DATE:       11/08/2 0. 74.  Anion gap was 4. The patient was typed and screened in the emergency room and admitted to the PCU for further evaluation and monitoring.   GI service was called and Dr. Praveen Martinez took the patient to the endoscopy suite and identified a large cavitating ul which the patient states he was told he was allergic to as an infant. He has avoided them, but does not know what exactly the reaction was. FAMILY HISTORY:  The patient's mother is 80 and has Alzheimer's. His father  at 80 of throat cancer.     SO NEUROLOGIC:  He was awake, alert, oriented x3 with no focal neurologic deficits. PSYCHIATRIC:  His mood and affect were pleasant and cooperative.     LABORATORY DATA:  Glucose 121, sodium 142, potassium 4.9, chloride 109, CO2 of 29, BUN of 44 with a crea

## 2019-11-09 NOTE — ANESTHESIA POSTPROCEDURE EVALUATION
Patient: Indio Ceja    Procedure Summary     Date:  11/09/19 Room / Location:  [address] [place] [address] ENDOSCOPY 01 / [address] [place] [address] ENDOSCOPY    Anesthesia Start:  0110 Anesthesia Stop:      Procedure:  ESOPHAGOGASTRODUODENOSCOPY (EGD) (N/A ) Diagnosis:       Gastrointestinal he

## 2019-11-09 NOTE — ED NOTES
Received pt from triage. Pt states 3 episodes at home of bright red liquid stools, abd pain for the past 2 days and decreased PO intake. Pt skin color noticed to be pale. Pt denies any weakness at this time. IV inserted, labs drawn and sent.

## 2019-11-09 NOTE — CONSULTS
REFERRING PHYSICIAN: Dr. Sweet ref. provider found    HPI:         Thank you very much for requesting me to see the patient. As you know, Jimmy Manzo is a 81year old male who presents today with 5 episodes of brbpr/maroon stools since ~ 5 pm today. jolynn appreciated. GI: Soft, NT, ND, normal BS. NO HSM, masses, hernias or bruits.   EXTREMITIES: no cyanosis, clubbing or edema    ___________________________________________________________    ASSESSMENT: In summary this is a 81year old male who prese

## 2019-11-10 VITALS
HEART RATE: 87 BPM | SYSTOLIC BLOOD PRESSURE: 139 MMHG | DIASTOLIC BLOOD PRESSURE: 73 MMHG | WEIGHT: 202 LBS | TEMPERATURE: 98 F | RESPIRATION RATE: 18 BRPM | BODY MASS INDEX: 29.92 KG/M2 | HEIGHT: 69 IN | OXYGEN SATURATION: 96 %

## 2019-11-10 PROCEDURE — 30233N1 TRANSFUSION OF NONAUTOLOGOUS RED BLOOD CELLS INTO PERIPHERAL VEIN, PERCUTANEOUS APPROACH: ICD-10-PCS | Performed by: HOSPITALIST

## 2019-11-10 PROCEDURE — 99217 OBSERVATION CARE DISCHARGE: CPT | Performed by: HOSPITALIST

## 2019-11-10 RX ORDER — POTASSIUM CHLORIDE 20 MEQ/1
40 TABLET, EXTENDED RELEASE ORAL EVERY 4 HOURS
Status: COMPLETED | OUTPATIENT
Start: 2019-11-10 | End: 2019-11-10

## 2019-11-10 RX ORDER — PANTOPRAZOLE SODIUM 40 MG/1
40 TABLET, DELAYED RELEASE ORAL EVERY 12 HOURS
Qty: 60 TABLET | Refills: 0 | Status: SHIPPED | OUTPATIENT
Start: 2019-11-10 | End: 2019-12-04

## 2019-11-10 RX ORDER — SODIUM CHLORIDE 9 MG/ML
INJECTION, SOLUTION INTRAVENOUS ONCE
Status: DISCONTINUED | OUTPATIENT
Start: 2019-11-10 | End: 2019-11-10

## 2019-11-10 NOTE — PLAN OF CARE
Problem: Patient Centered Care  Goal: Patient preferences are identified and integrated in the patient's plan of care  Description  Interventions:  - What would you like us to know as we care for you?  Like to be called bill  - Provide timely, complete, a Problem: SAFETY ADULT - FALL  Goal: Free from fall injury  Description  INTERVENTIONS:  - Assess pt frequently for physical needs  - Identify cognitive and physical deficits and behaviors that affect risk of falls.   - [place] fall precautions as indica ordered  - Obtain nutritional consult as needed  - Evaluate fluid balance  Outcome: Adequate for Discharge  Goal: Maintains or returns to baseline bowel function  Description  INTERVENTIONS:  - Assess bowel function  - Maintain adequate hydration with IV o for Discharge   Patient latest hgb 8.2. Patient stable. Orders for discharge obtained. Discussed with patient the need to follow up if symptoms worsen. Also informed of when to follow up. Prescriptions provided for meds upon dc.  Patient expressed a verbal

## 2019-11-10 NOTE — PROGRESS NOTES
GI  PROGRESS NOTE    SUBJECTIVE: tolerating clears. No abd pain.        OBJECTIVE:  Temp:  [97.8 °F (36.6 °C)-98.8 °F (37.1 °C)] 98.2 °F (36.8 °C)  Pulse:  [] 74  Resp:  [11-31] 18  BP: (102-148)/(54-99) 121/66  Exam  Gen: No acute distress, alert a

## 2019-11-10 NOTE — PROGRESS NOTES
GI  PROGRESS NOTE    SUBJECTIVE: tolerating diet; No abd pain.        OBJECTIVE:  Temp:  [98.1 °F (36.7 °C)-98.5 °F (36.9 °C)] 98.3 °F (36.8 °C)  Pulse:  [74-89] 87  Resp:  [18-19] 18  BP: (110-139)/(55-80) 139/73  Exam  Gen: No acute distress, alert and _______________________________________________________________

## 2019-11-10 NOTE — PROGRESS NOTES
[facility] Note: Route Optimization for Pantoprazole (PROTONIX)    Patient is currently on Pantoprazole (PROTONIX) 40 mg IV every 12 hours.    The patient meets the criteria to convert to the oral equivalent as established by the IV to Oral conversion pro

## 2019-11-10 NOTE — PLAN OF CARE
Problem: Patient Centered Care  Goal: Patient preferences are identified and integrated in the patient's plan of care  Description  Interventions:  - What would you like us to know as we care for you?   - Provide timely, complete, and accurate informatio injury  Description  INTERVENTIONS:  - Assess pt frequently for physical needs  - Identify cognitive and physical deficits and behaviors that affect risk of falls.   - [place] fall precautions as indicated by assessment.  - Educate pt/family on patient sa balance  Outcome: Progressing  Goal: Maintains or returns to baseline bowel function  Description  INTERVENTIONS:  - Assess bowel function  - Maintain adequate hydration with IV or PO as ordered and tolerated  - Evaluate effectiveness of GI medications  - blood noted. Repeat hemoglobin at midnight was 6.9. notified hospitalist. Received order for 1unit PRBC. Discussed plan of care with pt. Pt verbalized understanding. Signed consent for blood transfusion. Call light within reach. Frequent rounding done.

## 2019-12-02 ENCOUNTER — TELEPHONE (OUTPATIENT)
Dept: DERMATOLOGY CLINIC | Facility: CLINIC | Age: 69
End: 2019-12-02

## 2019-12-04 PROBLEM — K26.4 DUODENAL ULCER WITH HEMORRHAGE: Status: ACTIVE | Noted: 2019-12-04

## 2019-12-04 PROBLEM — D62 ANEMIA DUE TO ACUTE BLOOD LOSS: Status: ACTIVE | Noted: 2019-12-04

## 2019-12-29 NOTE — DISCHARGE SUMMARY
Chief Complaint   Patient presents with   • Eye Pain     Pt with pain/drainage to left eye. seen here recently for same and pt states not better.      Explained to pt triage process, made pt aware to tell this RN/staff of any changes/concerns, pt verbalized understanding of process and instructions given. Pt to ER alfredo.     [facility]    Discharge Summary    Abraham Galvan Patient Status:  Observation    10/26/1950 MRN [id number]   Location [facility] 5SW/SE Attending No att. providers found   Hosp Day # 0 PCP Jennifer Briseno MD     Date of Admis discomfort in his abdomen which has been getting progressively worse, sometimes difficult for him to find a comfortable position, will sometimes even go lie down, and try to lie in the fetal position, but still had some discomfort.   On Wednesday, he though least 6 ibuprofen a day for management of bilateral knee and right hip discomfort secondary to osteoarthritis. When I saw the patient, it was after his EGD. He was completely awake and alert. Denied any dizziness, lightheadedness.   He still had some mil Crisaborole 2 % Oint  Commonly known as:  EUCRISA      Apply to affected areas 2x/day   Quantity:  100 g  Refills:  3     LIPITOR 40 MG Tabs  Generic drug:  atorvastatin      Take 20 mg by mouth nightly.    Refills:  0     losartan Potassium 50 MG Tabs  C

## 2020-01-02 PROCEDURE — 88305 TISSUE EXAM BY PATHOLOGIST: CPT | Performed by: INTERNAL MEDICINE

## 2020-06-15 ENCOUNTER — OFFICE VISIT (OUTPATIENT)
Dept: DERMATOLOGY CLINIC | Facility: CLINIC | Age: 70
End: 2020-06-15
Payer: MEDICARE

## 2020-06-15 ENCOUNTER — TELEPHONE (OUTPATIENT)
Dept: DERMATOLOGY CLINIC | Facility: CLINIC | Age: 70
End: 2020-06-15

## 2020-06-15 DIAGNOSIS — Z86.018 HISTORY OF DYSPLASTIC NEVUS: ICD-10-CM

## 2020-06-15 DIAGNOSIS — L81.4 SOLAR LENTIGO: ICD-10-CM

## 2020-06-15 DIAGNOSIS — D23.60 BENIGN NEOPLASM OF SKIN OF UPPER EXTREMITY AND SHOULDER, UNSPECIFIED LATERALITY: ICD-10-CM

## 2020-06-15 DIAGNOSIS — L30.9 HAND DERMATITIS: Primary | ICD-10-CM

## 2020-06-15 DIAGNOSIS — D23.30 BENIGN NEOPLASM OF SKIN OF FACE: ICD-10-CM

## 2020-06-15 DIAGNOSIS — L30.4 INTERTRIGO: ICD-10-CM

## 2020-06-15 DIAGNOSIS — L57.0 ACTINIC KERATOSIS: ICD-10-CM

## 2020-06-15 DIAGNOSIS — D23.5 BENIGN NEOPLASM OF SKIN OF TRUNK, EXCEPT SCROTUM: ICD-10-CM

## 2020-06-15 DIAGNOSIS — D23.70 BENIGN NEOPLASM OF SKIN OF LOWER EXTREMITY, INCLUDING HIP, UNSPECIFIED LATERALITY: ICD-10-CM

## 2020-06-15 DIAGNOSIS — D23.4 BENIGN NEOPLASM OF SCALP AND SKIN OF NECK: ICD-10-CM

## 2020-06-15 DIAGNOSIS — L82.1 SEBORRHEIC KERATOSES: ICD-10-CM

## 2020-06-15 PROCEDURE — 17000 DESTRUCT PREMALG LESION: CPT | Performed by: DERMATOLOGY

## 2020-06-15 PROCEDURE — G0463 HOSPITAL OUTPT CLINIC VISIT: HCPCS | Performed by: DERMATOLOGY

## 2020-06-15 PROCEDURE — 99213 OFFICE O/P EST LOW 20 MIN: CPT | Performed by: DERMATOLOGY

## 2020-06-15 RX ORDER — KETOCONAZOLE 20 MG/G
CREAM TOPICAL
Qty: 30 G | Refills: 1 | Status: SHIPPED | OUTPATIENT
Start: 2020-06-15 | End: 2020-12-21

## 2020-06-15 NOTE — TELEPHONE ENCOUNTER
Pt seen by LSS 6/15/20, concerned about the price of eucrissa (he's on medicare) seems [facility] would be the cheapest but they need his medicare info, discussed with pt's wife - they will contact [facility] and if they wish to have this RX transferred there, t

## 2020-06-15 NOTE — PROGRESS NOTES
HPI:     Chief Complaint     Full Skin Exam        HPI     Full Skin Exam      Additional comments: LOV 8/13/2019 - Pt presenting for yearly full body skin exam.  Pt has personal hx of  Atypical Nevus mid chest.  Pt denies family hx of skin cancer. Vitamin C 500 MG Oral Tab Take 500 mg by mouth daily. • Cholecalciferol (VITAMIN D) 1000 units Oral Tab Take by mouth daily. • atorvastatin (LIPITOR) 20 MG Oral Tab Take 20 mg by mouth nightly.        • MULTIVITAMIN OR daily     • Clobetasol Propion Occupational History      Not on file    Social Needs      Financial resource strain: Not on file      Food insecurity:        Worry: Not on file        Inability: Not on file      Transportation needs:        Medical: Not on file        Non-medical: Not o was remarkable for the following:  - there is no evidence of recurrent previously excised mild dysplastic nevus from lumbosacral area or atypical nevus from the mid chest.  - melanocytic nevi are uniform in color, shape and borders.   -there are scattered this does not appear to be covered by insurance. Orders Placed This Encounter      DESTRUCTION PREMALIGNANT LESIONS, FIRST LES      Results From Past 48 Hours:  No results found for this or any previous visit (from the past 50 hour(s)).     Meds This Vis

## 2020-06-16 ENCOUNTER — PATIENT MESSAGE (OUTPATIENT)
Dept: DERMATOLOGY CLINIC | Facility: CLINIC | Age: 70
End: 2020-06-16

## 2020-06-16 NOTE — TELEPHONE ENCOUNTER
LMTCB to discuss with pt. Last telephone encounter from 6/15 states pt's wife was going to try to get medication through [facility] and let us know where to send it.   Spoke with pharmacist at [facility] and they state [employer] will not cover medication

## 2020-06-19 NOTE — TELEPHONE ENCOUNTER
Spoke with [facility] and they state pt has been notified that he may qualify for their pt assistance program.  Forms sent to pt to fill out. [facility]yl faxing forms to our office to fill out and have [address] sign and fax back to [facility] at [phone].

## 2020-06-19 NOTE — TELEPHONE ENCOUNTER
Received provider portion of the form. Placed in [facility] Krista Carreno for signature. Staff - once signed, please fax to [facility] at [phone]. Thank you.

## 2020-08-06 ENCOUNTER — TELEPHONE (OUTPATIENT)
Dept: DERMATOLOGY CLINIC | Facility: CLINIC | Age: 70
End: 2020-08-06

## 2020-08-06 NOTE — TELEPHONE ENCOUNTER
Call from [place] at [facility]    Asking for update on PA. Has this been completed?      Please call [phone]

## 2020-09-01 ENCOUNTER — PATIENT MESSAGE (OUTPATIENT)
Dept: DERMATOLOGY CLINIC | Facility: CLINIC | Age: 70
End: 2020-09-01

## 2020-12-04 ENCOUNTER — TELEPHONE (OUTPATIENT)
Dept: DERMATOLOGY CLINIC | Facility: CLINIC | Age: 70
End: 2020-12-04

## 2020-12-04 NOTE — TELEPHONE ENCOUNTER
[employer] pt assistance form prescriber section placed in LSS' inbox for signature. Staff - once signed, please fax to [facility] at [phone]. Thank you.

## 2020-12-08 ENCOUNTER — MED REC SCAN ONLY (OUTPATIENT)
Dept: DERMATOLOGY CLINIC | Facility: CLINIC | Age: 70
End: 2020-12-08

## 2020-12-21 ENCOUNTER — OFFICE VISIT (OUTPATIENT)
Dept: DERMATOLOGY CLINIC | Facility: CLINIC | Age: 70
End: 2020-12-21
Payer: MEDICARE

## 2020-12-21 DIAGNOSIS — Z86.018 HISTORY OF DYSPLASTIC NEVUS: ICD-10-CM

## 2020-12-21 DIAGNOSIS — D23.4 BENIGN NEOPLASM OF SCALP AND SKIN OF NECK: ICD-10-CM

## 2020-12-21 DIAGNOSIS — D23.30 BENIGN NEOPLASM OF SKIN OF FACE: ICD-10-CM

## 2020-12-21 DIAGNOSIS — Z87.2 HISTORY OF ACTINIC KERATOSES: ICD-10-CM

## 2020-12-21 DIAGNOSIS — D23.5 BENIGN NEOPLASM OF SKIN OF TRUNK, EXCEPT SCROTUM: ICD-10-CM

## 2020-12-21 DIAGNOSIS — L82.1 SEBORRHEIC KERATOSES: ICD-10-CM

## 2020-12-21 DIAGNOSIS — L30.4 INTERTRIGO: ICD-10-CM

## 2020-12-21 DIAGNOSIS — D23.60 BENIGN NEOPLASM OF SKIN OF UPPER EXTREMITY AND SHOULDER, UNSPECIFIED LATERALITY: ICD-10-CM

## 2020-12-21 DIAGNOSIS — L81.4 SOLAR LENTIGO: ICD-10-CM

## 2020-12-21 DIAGNOSIS — L30.9 HAND DERMATITIS: Primary | ICD-10-CM

## 2020-12-21 PROCEDURE — 99213 OFFICE O/P EST LOW 20 MIN: CPT | Performed by: DERMATOLOGY

## 2020-12-21 PROCEDURE — G0463 HOSPITAL OUTPT CLINIC VISIT: HCPCS | Performed by: DERMATOLOGY

## 2020-12-21 RX ORDER — CRISABOROLE 20 MG/G
OINTMENT TOPICAL
Qty: 100 G | Refills: 6 | Status: SHIPPED | OUTPATIENT
Start: 2020-12-21

## 2020-12-21 RX ORDER — KETOCONAZOLE 20 MG/G
CREAM TOPICAL
Qty: 30 G | Refills: 1 | Status: SHIPPED | OUTPATIENT
Start: 2020-12-21

## 2020-12-21 NOTE — PROGRESS NOTES
HPI:     Chief Complaint     Upper Body Exam        HPI     Upper Body Exam      Additional comments: LOV 6/15/2020 Patient present for upper body skin exam. Patient denies any hx of sc           Last edited by Clarisa De Leon, Jerry[address] [place], [place] [address] on 12/21/2020  9:17 AM. needed 30 g 2   • Cholecalciferol (VITAMIN D) 1000 units Oral Tab Take by mouth daily. • atorvastatin (LIPITOR) 20 MG Oral Tab Take 20 mg by mouth nightly. • MULTIVITAMIN OR daily     • IRON OR 5 mg.        Allergies:     Penicillins on file        Non-medical: Not on file    Tobacco Use      Smoking status: Never Smoker      Smokeless tobacco: Never Used    Substance and Sexual Activity      Alcohol use: No        Alcohol/week: 0.0 standard drinks      Drug use: No      Sexual activit of the second fingers bilaterally. Also little bit of erythema on the left third dorsal finger.  - there are some cherry red papule  - there are many brown stuck on keratotic lesions. –No significant intertriginous eruption at this time.         ASSESSMEN

## 2021-02-10 ENCOUNTER — PATIENT MESSAGE (OUTPATIENT)
Dept: DERMATOLOGY CLINIC | Facility: CLINIC | Age: 71
End: 2021-02-10

## 2021-03-03 ENCOUNTER — PATIENT MESSAGE (OUTPATIENT)
Dept: DERMATOLOGY CLINIC | Facility: CLINIC | Age: 71
End: 2021-03-03

## 2021-03-03 ENCOUNTER — TELEPHONE (OUTPATIENT)
Dept: DERMATOLOGY CLINIC | Facility: CLINIC | Age: 71
End: 2021-03-03

## 2021-03-03 NOTE — TELEPHONE ENCOUNTER
Fax reviewed - they asked for qty and sig on eucrissa - filled out and faxed back to [phone]. Sent to scan.  Qty 100gm with sig \"apply BID\"

## 2021-03-03 NOTE — TELEPHONE ENCOUNTER
From: Suleman Gallo  To: Sergey Mccoy MD  Sent: 2/10/2021 3:53 PM CST  Subject: Prescription Question    Hi, Dr. Bernardo Orona! I checked in with the [facility] program today to check on the status of my application for [facility].  I had submitted my

## 2021-06-14 ENCOUNTER — OFFICE VISIT (OUTPATIENT)
Dept: DERMATOLOGY CLINIC | Facility: CLINIC | Age: 71
End: 2021-06-14
Payer: MEDICARE

## 2021-06-14 DIAGNOSIS — Z86.018 HISTORY OF DYSPLASTIC NEVUS: ICD-10-CM

## 2021-06-14 DIAGNOSIS — D23.5 BENIGN NEOPLASM OF SKIN OF TRUNK, EXCEPT SCROTUM: ICD-10-CM

## 2021-06-14 DIAGNOSIS — D23.60 BENIGN NEOPLASM OF SKIN OF UPPER EXTREMITY AND SHOULDER, UNSPECIFIED LATERALITY: ICD-10-CM

## 2021-06-14 DIAGNOSIS — D23.4 BENIGN NEOPLASM OF SCALP AND SKIN OF NECK: ICD-10-CM

## 2021-06-14 DIAGNOSIS — L30.9 HAND DERMATITIS: Primary | ICD-10-CM

## 2021-06-14 DIAGNOSIS — D23.70 BENIGN NEOPLASM OF SKIN OF LOWER EXTREMITY, INCLUDING HIP, UNSPECIFIED LATERALITY: ICD-10-CM

## 2021-06-14 DIAGNOSIS — D23.30 BENIGN NEOPLASM OF SKIN OF FACE: ICD-10-CM

## 2021-06-14 DIAGNOSIS — L81.4 SOLAR LENTIGO: ICD-10-CM

## 2021-06-14 DIAGNOSIS — Z87.2 HISTORY OF ACTINIC KERATOSES: ICD-10-CM

## 2021-06-14 DIAGNOSIS — L82.1 SEBORRHEIC KERATOSES: ICD-10-CM

## 2021-06-14 PROCEDURE — 99213 OFFICE O/P EST LOW 20 MIN: CPT | Performed by: DERMATOLOGY

## 2021-06-14 NOTE — PROGRESS NOTES
HPI:     Chief Complaint     Full Skin Exam        HPI     Full Skin Exam      Additional comments: LOV 12/21/20. Denies family or personal hx of skin ca.  Pt presents for full body exam and f/u on hand dermatitis has been using hydrocortisone and [place] Q10 (COQ-10) 200 MG Oral Cap Take by mouth. • losartan Potassium 50 MG Oral Tab Take 50 mg by mouth daily. • Vitamin C 500 MG Oral Tab Take 500 mg by mouth daily. • Cholecalciferol (VITAMIN D) 1000 units Oral Tab Take by mouth daily.      • ator defibrillator: Not Asked        Reaction to local anesthetic: No    Social History Narrative      Not on file    Social Determinants of Health  Financial Resource Strain:       Difficulty of Paying Living Expenses:   Food Insecurity:       Worried About Ru Patient does not wear reading  - there are some cherry red papules  - there are a few brown stuck on keratoses. 1 tan spot on right thigh that patient had questions about. –Intertriginous rash better.         ASSESSMENT/PLAN:   Hand dermatitis  (primary e

## 2022-01-06 ENCOUNTER — TELEPHONE (OUTPATIENT)
Dept: DERMATOLOGY CLINIC | Facility: CLINIC | Age: 72
End: 2022-01-06

## 2022-01-06 NOTE — TELEPHONE ENCOUNTER
Dr. Azalia Donahue - prescriber section of the [employer] enrollment form filled out and placed in your inbox for review and signature. Thank you.

## 2022-01-06 NOTE — TELEPHONE ENCOUNTER
[employer] for you called    Asking to complete re-enrollment form for [place].      Any questions please call [phone]

## 2022-01-10 NOTE — TELEPHONE ENCOUNTER
Prescriber portion of the [employer] 4 you form faxed to [employer] on 1/10/22 and sent to scan. Pt informed and grateful. Pt states the Nayana Mojica has been working very well for his hands.

## 2022-04-07 ENCOUNTER — OFFICE VISIT (OUTPATIENT)
Dept: SURGERY | Facility: CLINIC | Age: 72
End: 2022-04-07
Payer: MEDICARE

## 2022-04-07 VITALS — SYSTOLIC BLOOD PRESSURE: 133 MMHG | DIASTOLIC BLOOD PRESSURE: 85 MMHG | HEART RATE: 81 BPM

## 2022-04-07 DIAGNOSIS — R31.0 GROSS HEMATURIA: Primary | ICD-10-CM

## 2022-04-07 LAB
BILIRUB UR QL: NEGATIVE
CLARITY UR: CLEAR
COLOR UR: YELLOW
GLUCOSE UR-MCNC: NEGATIVE MG/DL
HGB UR QL STRIP.AUTO: NEGATIVE
KETONES UR-MCNC: NEGATIVE MG/DL
LEUKOCYTE ESTERASE UR QL STRIP.AUTO: NEGATIVE
NITRITE UR QL STRIP.AUTO: NEGATIVE
PH UR: 6 [PH] (ref 5–8)
PROT UR-MCNC: NEGATIVE MG/DL
SP GR UR STRIP: 1.01 (ref 1–1.03)
UROBILINOGEN UR STRIP-ACNC: <2
VIT C UR-MCNC: NEGATIVE MG/DL

## 2022-04-07 PROCEDURE — 88108 CYTOPATH CONCENTRATE TECH: CPT | Performed by: NURSE PRACTITIONER

## 2022-04-07 PROCEDURE — 87086 URINE CULTURE/COLONY COUNT: CPT | Performed by: NURSE PRACTITIONER

## 2022-04-07 PROCEDURE — 81003 URINALYSIS AUTO W/O SCOPE: CPT | Performed by: NURSE PRACTITIONER

## 2022-04-20 ENCOUNTER — HOSPITAL ENCOUNTER (OUTPATIENT)
Dept: CT IMAGING | Facility: HOSPITAL | Age: 72
Discharge: HOME | End: 2022-04-20
Attending: NURSE PRACTITIONER
Payer: MEDICARE

## 2022-04-20 DIAGNOSIS — R31.0 GROSS HEMATURIA: ICD-10-CM

## 2022-04-20 LAB — CREAT BLD-MCNC: 0.6 MG/DL

## 2022-04-20 PROCEDURE — 74178 CT ABD&PLV WO CNTR FLWD CNTR: CPT | Performed by: NURSE PRACTITIONER

## 2022-04-20 PROCEDURE — 76377 3D RENDER W/INTRP POSTPROCES: CPT | Performed by: NURSE PRACTITIONER

## 2022-04-20 PROCEDURE — 82565 ASSAY OF CREATININE: CPT

## 2022-05-11 ENCOUNTER — PROCEDURE (OUTPATIENT)
Dept: SURGERY | Facility: CLINIC | Age: 72
End: 2022-05-11
Payer: MEDICARE

## 2022-05-11 VITALS — DIASTOLIC BLOOD PRESSURE: 82 MMHG | HEART RATE: 73 BPM | SYSTOLIC BLOOD PRESSURE: 124 MMHG

## 2022-05-11 DIAGNOSIS — R31.0 GROSS HEMATURIA: Primary | ICD-10-CM

## 2022-05-11 PROCEDURE — 99213 OFFICE O/P EST LOW 20 MIN: CPT | Performed by: UROLOGY

## 2022-05-11 PROCEDURE — 52000 CYSTOURETHROSCOPY: CPT | Performed by: UROLOGY

## 2022-05-11 RX ORDER — CIPROFLOXACIN 500 MG/1
500 TABLET, FILM COATED ORAL ONCE
Status: COMPLETED | OUTPATIENT
Start: 2022-05-11 | End: 2022-05-11

## 2022-05-11 RX ADMIN — CIPROFLOXACIN 500 MG: 500 TABLET, FILM COATED ORAL at 11:12:00

## 2022-05-11 NOTE — PROGRESS NOTES
[facility], [facility]  Follow-Up Visit    HPI: Matilde Enamorado is a 73year old male presents for a follow up visit. Patient was last seen on 4/7/22 by SARATH Aoyn. INTERVAL HISTORY: Patient presents for office cystoscopy to complete evaluation of history of gross hematuria. Patient reports an episode of painless gross hematuria in January 2022 that lasted 2 voids, no clots, and occurred less than 24 hours after sexual intercourse. Voided urine cytology was benign. CT urogram revealed 2 nonobstructing left kidney stones, up to 5 mm in size at the corticomedullary junction. Incidental finding of a 1.8 cm enhancing focus at the base of the prostate gland in the expected insertion of the seminal vesicles. Further evaluation recommended with prostate MRI however patient did not complete. Atrophic right seminal vesicle. Patient denies recurrent gross hematuria, dysuria, flank pain, sensation of incomplete bladder emptying. He reports nocturia x1-3. He reports good stream.  Longstanding urinary frequency and urgency since he was a child. Reviewed past medical, surgical, family, and social history. Reviewed med list and allergies. REVIEW OF SYSTEMS:  Pertinent positives and negatives per HPI. A 5-point ROS was performed and is otherwise negative. EXAM:  /82 (BP Location: Left arm, Patient Position: Sitting, Cuff Size: large)   Pulse 73      Physical Exam  Constitutional:       Appearance: He is well-developed. HENT:      Head: Normocephalic. Eyes:      General: No scleral icterus. Cardiovascular:      Rate and Rhythm: Normal rate. Pulmonary:      Effort: Pulmonary effort is normal.   Genitourinary:     Penis: Circumcised. Skin:     General: Skin is warm and dry. Neurological:      Mental Status: He is alert and oriented to person, place, and time.    Psychiatric:         Mood and Affect: Mood normal.         Behavior: Behavior normal. PATHOLOGY:  See HPI for details. LABS:  See [facility] for details. IMAGING:    CT UROGRAM (4/20/2022):  1. Nonobstructing subcentimeter renal stones bilaterally. No obstructing ureteral stones or suspicious renal/urothelial lesions. 2. Mild pelviectasis in the left kidney is retrospectively unchanged from 2019 and could relate to a prominent extrarenal pelvis or a mild chronic ureteropelvic junction obstruction. 3. Chronic markedly atrophic right seminal vesicle again noted with development of a 1.8 x 1.0 x 1.2 cm enhancing focus at the base of the prostate gland near the expected insertion of the seminal vesicles. This finding is poorly characterized by CT technique and could relate to a BPH nodule, however if there is clinical concern for an underlying malignancy obstructing the seminal vesicle recommend correlation with serum PSA levels and consider further assessment with a multiparametric MRI of the prostate gland, if warranted. 4. Lesser incidental findings as above. UROLOGY PROCEDURE:    CYSTOURETHROSCOPY  Informed consent was obtained for the procedure. The site was prepped and draped in the usual sterile fashion. An Olympus 16 [place] digital flexible cystoscope was utilized for the procedure. Anesthesia:  2% lidocaine gel    Urethra: Normal without strictures or masses. Prostate / Pelvic: 1+ enlarged with kissing lateral lobes. No significant median lobe enlargement. Bladder: Normal.  No tumor, stone, diverticulum, or glomerulation    U.O's: Normal    Trabeculation: +1. POST CYSTOSCOPY MEDICATIONS: sample one tablet Cipro 500 mg given to patient    DIAGNOSIS: Mild BPH. IMPRESSION & PLAN:  73year old male with an episode of mild painless gross hematuria, less than 24 hours following sexual activity.     Work-up included urine cytology which was benign and CT urogram which incidentally revealed small subcentimeter nonobstructing kidney stones and a 1.8 centimeters enhancing focus at the base of the prostate gland near the expected insertion of the seminal vesicles which was recommended to be further evaluated with a prostate MRI. Office cystoscopy today was essentially unrevealing except for mild BPH. Patient currently not bothered by his symptoms. Management options discussed. He elects watchful waiting. We discussed obtaining a prostate MRI for further evaluation of the incidental finding on CT urogram.  His PSA level is normal which is quite reassuring. Patient states that he would get the MRI pending insurance authorization. All questions answered.       Trang Prabhakar MD  5/11/2022

## 2022-06-02 ENCOUNTER — HOSPITAL ENCOUNTER (OUTPATIENT)
Dept: MRI IMAGING | Facility: HOSPITAL | Age: 72
Discharge: HOME | End: 2022-06-02
Attending: NURSE PRACTITIONER
Payer: MEDICARE

## 2022-06-02 DIAGNOSIS — N50.89: ICD-10-CM

## 2022-06-02 DIAGNOSIS — R93.89 ABNORMAL FINDING ON CT SCAN: ICD-10-CM

## 2022-06-02 DIAGNOSIS — N42.9 PROSTATE IRREGULARITY: ICD-10-CM

## 2022-06-02 PROCEDURE — A9575 INJ GADOTERATE MEGLUMI 0.1ML: HCPCS | Performed by: NURSE PRACTITIONER

## 2022-06-02 PROCEDURE — 72197 MRI PELVIS W/O & W/DYE: CPT | Performed by: NURSE PRACTITIONER

## 2022-06-13 ENCOUNTER — OFFICE VISIT (OUTPATIENT)
Dept: DERMATOLOGY CLINIC | Facility: CLINIC | Age: 72
End: 2022-06-13
Payer: MEDICARE

## 2022-06-13 DIAGNOSIS — D22.9 MULTIPLE NEVI: ICD-10-CM

## 2022-06-13 DIAGNOSIS — L82.1 SEBORRHEIC KERATOSES: ICD-10-CM

## 2022-06-13 DIAGNOSIS — L30.4 INTERTRIGO: Primary | ICD-10-CM

## 2022-06-13 DIAGNOSIS — D23.4 BENIGN NEOPLASM OF SCALP AND SKIN OF NECK: ICD-10-CM

## 2022-06-13 DIAGNOSIS — L57.0 ACTINIC KERATOSIS: ICD-10-CM

## 2022-06-13 DIAGNOSIS — L81.4 SOLAR LENTIGO: ICD-10-CM

## 2022-06-13 DIAGNOSIS — D23.30 BENIGN NEOPLASM OF SKIN OF FACE: ICD-10-CM

## 2022-06-13 DIAGNOSIS — D23.5 BENIGN NEOPLASM OF SKIN OF TRUNK, EXCEPT SCROTUM: ICD-10-CM

## 2022-06-13 DIAGNOSIS — D23.70 BENIGN NEOPLASM OF SKIN OF LOWER EXTREMITY, INCLUDING HIP, UNSPECIFIED LATERALITY: ICD-10-CM

## 2022-06-13 DIAGNOSIS — Z86.018 HISTORY OF DYSPLASTIC NEVUS: ICD-10-CM

## 2022-06-13 DIAGNOSIS — D23.60 BENIGN NEOPLASM OF SKIN OF UPPER EXTREMITY AND SHOULDER, UNSPECIFIED LATERALITY: ICD-10-CM

## 2022-06-13 PROCEDURE — 99213 OFFICE O/P EST LOW 20 MIN: CPT | Performed by: DERMATOLOGY

## 2022-06-13 PROCEDURE — 17000 DESTRUCT PREMALG LESION: CPT | Performed by: DERMATOLOGY

## 2022-06-13 PROCEDURE — 17003 DESTRUCT PREMALG LES 2-14: CPT | Performed by: DERMATOLOGY

## 2022-06-13 RX ORDER — KETOCONAZOLE 20 MG/G
CREAM TOPICAL
Qty: 30 G | Refills: 5 | Status: SHIPPED | OUTPATIENT
Start: 2022-06-13

## 2022-06-13 RX ORDER — LOSARTAN POTASSIUM 100 MG/1
50 TABLET ORAL DAILY
COMMUNITY
Start: 2022-04-03

## 2023-06-12 ENCOUNTER — OFFICE VISIT (OUTPATIENT)
Dept: DERMATOLOGY CLINIC | Facility: CLINIC | Age: 73
End: 2023-06-12

## 2023-06-12 DIAGNOSIS — L57.0 ACTINIC KERATOSIS: Primary | ICD-10-CM

## 2023-06-12 DIAGNOSIS — L81.4 SOLAR LENTIGO: ICD-10-CM

## 2023-06-12 DIAGNOSIS — D23.4 BENIGN NEOPLASM OF SCALP AND SKIN OF NECK: ICD-10-CM

## 2023-06-12 DIAGNOSIS — D23.60 BENIGN NEOPLASM OF SKIN OF UPPER EXTREMITY AND SHOULDER, UNSPECIFIED LATERALITY: ICD-10-CM

## 2023-06-12 DIAGNOSIS — L82.1 SEBORRHEIC KERATOSES: ICD-10-CM

## 2023-06-12 DIAGNOSIS — D22.9 MULTIPLE NEVI: ICD-10-CM

## 2023-06-12 DIAGNOSIS — D23.30 BENIGN NEOPLASM OF SKIN OF FACE: ICD-10-CM

## 2023-06-12 DIAGNOSIS — Z86.018 HISTORY OF DYSPLASTIC NEVUS: ICD-10-CM

## 2023-06-12 DIAGNOSIS — L30.4 INTERTRIGO: ICD-10-CM

## 2023-06-12 DIAGNOSIS — D23.5 BENIGN NEOPLASM OF SKIN OF TRUNK, EXCEPT SCROTUM: ICD-10-CM

## 2023-06-12 DIAGNOSIS — D23.70 BENIGN NEOPLASM OF SKIN OF LOWER EXTREMITY, INCLUDING HIP, UNSPECIFIED LATERALITY: ICD-10-CM

## 2023-06-12 PROCEDURE — 99214 OFFICE O/P EST MOD 30 MIN: CPT | Performed by: DERMATOLOGY

## 2023-06-12 RX ORDER — COVID-19 ANTIGEN TEST
KIT MISCELLANEOUS
COMMUNITY
Start: 2023-05-02

## 2023-06-12 RX ORDER — KETOCONAZOLE 20 MG/G
CREAM TOPICAL
Qty: 30 G | Refills: 5 | Status: SHIPPED | OUTPATIENT
Start: 2023-06-12

## 2023-06-12 RX ORDER — CYANOCOBALAMIN (VITAMIN B-12) 1000 MCG
TABLET ORAL
COMMUNITY
Start: 2020-12-29

## 2024-06-12 ENCOUNTER — OFFICE VISIT (OUTPATIENT)
Dept: DERMATOLOGY CLINIC | Facility: CLINIC | Age: 74
End: 2024-06-12
Payer: MEDICARE

## 2024-06-12 DIAGNOSIS — D23.9 BENIGN NEOPLASM OF SKIN, UNSPECIFIED LOCATION: ICD-10-CM

## 2024-06-12 DIAGNOSIS — L81.4 SOLAR LENTIGO: ICD-10-CM

## 2024-06-12 DIAGNOSIS — L30.4 INTERTRIGO: ICD-10-CM

## 2024-06-12 DIAGNOSIS — D22.9 MULTIPLE NEVI: ICD-10-CM

## 2024-06-12 DIAGNOSIS — L82.1 SEBORRHEIC KERATOSES: ICD-10-CM

## 2024-06-12 DIAGNOSIS — L57.0 ACTINIC KERATOSIS: Primary | ICD-10-CM

## 2024-06-12 DIAGNOSIS — Z86.018 HISTORY OF DYSPLASTIC NEVUS: ICD-10-CM

## 2024-06-12 DIAGNOSIS — Z13.89 ENCOUNTER FOR SURVEILLANCE OF ABNORMAL NEVI: ICD-10-CM

## 2024-06-12 PROCEDURE — 99213 OFFICE O/P EST LOW 20 MIN: CPT | Performed by: DERMATOLOGY

## 2024-06-12 PROCEDURE — 17003 DESTRUCT PREMALG LES 2-14: CPT | Performed by: DERMATOLOGY

## 2024-06-12 PROCEDURE — 17000 DESTRUCT PREMALG LESION: CPT | Performed by: DERMATOLOGY

## 2024-06-12 RX ORDER — KETOCONAZOLE 20 MG/G
CREAM TOPICAL
Qty: 30 G | Refills: 6 | Status: SHIPPED | OUTPATIENT
Start: 2024-06-12

## 2024-06-12 NOTE — PROGRESS NOTES
Reinaldo Weinberg is a 73 year old male.  HPI:     CC:    Chief Complaint   Patient presents with    Full Skin Exam     Hx of Aks and dysplastic nevus.  LOV 6/2023.  Pt presents for FBSE. Spot of concern on R Arm, L Arm-3 weeks no symptoms, and between toes.          Allergies:  Penicillins and Sulfa antibiotics    HISTORY:    Past Medical History:    Atypical nevus    Mild to moderate mid  chest    Calculus of kidney    per [facility]: kidney stone removed    Cellulitis and abscess of upper arm and forearm    right    Duodenal ulcer    Dysplastic nevus    lumbosacral back, Compound Dysplastic Nevus-  mild dysplasia    Family history of unspecified malignant neoplasm    prostate    High cholesterol    Osteoarthritis    Other and unspecified hyperlipidemia    Visual impairment      Past Surgical History:   Procedure Laterality Date    Colonoscopy  12/2018    Colonoscopy,diagnostic  '05    Cystoscopy,insert ureteral stent      Egd  11/2019    Large DU w bleeding, ?EoE    Special service or report      kidney stone removal    Vein ligation and stripping Right       Family History   Problem Relation Age of Onset    Cancer Father         cancer, lung -smoker    Hypertension Father     Lipids Mother       Social History     Socioeconomic History    Marital status:    Tobacco Use    Smoking status: Never    Smokeless tobacco: Never   Vaping Use    Vaping status: Never Used   Substance and Sexual Activity    Alcohol use: No     Alcohol/week: 0.0 standard drinks of alcohol    Drug use: No   Other Topics Concern    Pt has a pacemaker No    Reaction to local anesthetic No        Current Outpatient Medications   Medication Sig Dispense Refill    diclofenac 1 % External Gel       hydrocortisone 2.5 % External Cream Apply 1 Application  topically 2 (two) times daily. APPLY TO AFFECTED AREA 28.35 g 6    ketoconazole 2 % External Cream Apply to affected area 2 times daily. 30 g 6    FLOWFLEX COVID-19 AG HOME TEST In Vitro  Kit       Cyanocobalamin (B-12) 1000 MCG Oral Tab       Misc Natural Products (GLUCOSAMINE CHOND COMPLEX/MSM OR)       losartan 100 MG Oral Tab Take 0.5 tablets (50 mg total) by mouth daily.      Crisaborole (EUCRISA) 2 % External Ointment Apply to affected areas 2x/day 100 g 6    Coenzyme Q10 (COQ-10) 200 MG Oral Cap Take by mouth.      losartan Potassium 50 MG Oral Tab Take 1 tablet (50 mg total) by mouth daily.      Vitamin C 500 MG Oral Tab Take 1 tablet (500 mg total) by mouth daily.      Cholecalciferol (VITAMIN D) 1000 units Oral Tab Take by mouth daily.      atorvastatin 20 MG Oral Tab Take 1 tablet (20 mg total) by mouth nightly.      MULTIVITAMIN OR daily       Allergies:   Allergies   Allergen Reactions    Penicillins     Sulfa Antibiotics        Past Medical History:    Atypical nevus    Mild to moderate mid  chest    Calculus of kidney    per [facility]: kidney stone removed    Cellulitis and abscess of upper arm and forearm    right    Duodenal ulcer    Dysplastic nevus    lumbosacral back, Compound Dysplastic Nevus-  mild dysplasia    Family history of unspecified malignant neoplasm    prostate    High cholesterol    Osteoarthritis    Other and unspecified hyperlipidemia    Visual impairment     Past Surgical History:   Procedure Laterality Date    Colonoscopy  12/2018    Colonoscopy,diagnostic  '05    Cystoscopy,insert ureteral stent      Egd  11/2019    Large DU w bleeding, ?EoE    Special service or report      kidney stone removal    Vein ligation and stripping Right      Social History     Socioeconomic History    Marital status:      Spouse name: Not on file    Number of children: Not on file    Years of education: Not on file    Highest education level: Not on file   Occupational History    Not on file   Tobacco Use    Smoking status: Never    Smokeless tobacco: Never   Vaping Use    Vaping status: Never Used   Substance and Sexual Activity    Alcohol use: No     Alcohol/week: 0.0 standard  drinks of alcohol    Drug use: No    Sexual activity: Not on file   Other Topics Concern    Grew up on a farm Not Asked    History of tanning Not Asked    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    HPI:    Chief Complaint   Patient presents with    Full Skin Exam     Hx of Aks and dysplastic nevus.  LOV 6/2023.  Pt presents for FBSE. Spot of concern on R Arm, L Arm-3 weeks no symptoms, and between toes.        Follow-up history AK's, dysplastic nevi no particular concerns.  Hand dermatitis, intertrigo been pretty stable    Intertrigo using ketoconazole hydrocortisone with good control    Concerned lesion left nipple, lesions on back    Past notes/ records and appropriate/relevant lab results including pathology and past body maps reviewed. Including outside notes/ PCP notes as appropriate. Updated and new information noted in current visit.     Patient presents with concerns above.    Patient has been in their usual state of health.      History, medications, allergies reviewed as noted.      ROS:  new relevant systemic complaints as noted       Physical Examination:     Well-developed well-nourished patient alert oriented in no acute distress.  Exam total-body performed, including scalp, head, neck, face,nails, hair, external eyes, including conjunctival mucosa, eyelids, lips external ears, back, chest,/ breasts, axillae,  abdomen, arms, legs,  palms.     Multiple light to medium brown, well marginated, uniformly pigmented, macules and papules 6 mm and less scattered on exam. pigmented lesions examined with dermoscopy benign-appearing patterns.     Waxy tannish keratotic papules scattered, cherry-red vascular papules scattered.    See map today's date for lesions noted .      Otherwise remarkable for lesions as noted on map.  See details of examination  See Assessment /Plan for additional history and physical exam also:    Assessment / plan:    No orders of the defined types were placed in this encounter.      Meds & Refills for this Visit:  Requested Prescriptions     Signed Prescriptions Disp Refills    hydrocortisone 2.5 % External Cream 28.35 g 6     Sig: Apply 1 Application  topically 2 (two) times daily. APPLY TO AFFECTED AREA    ketoconazole 2 % External Cream 30 g 6     Sig: Apply to affected area 2 times daily.         Encounter Diagnoses   Name Primary?    Actinic keratosis Yes    Multiple nevi     Seborrheic keratoses     Solar lentigo     History of dysplastic nevus     Intertrigo     Benign neoplasm of skin, unspecified location     Encounter for surveillance of abnormal nevi        See details on map.      Remarkable for:    Erythematous scaling keratotic papules noted at sites noted on map  Actinic Keratoses.  Precancerous nature discussed. Sun protection, sunscreen/ blocks encouraged Lesions treated with cryo- .  Biopsy if not resolved.    X4  Cheeks nose arms    Evolving SK over right forearm monitor recheck    Areas of postinflammatory hyperpigmentation over the right lower extremity    Intertrigo stable continue ketoconazole hydrocortisone combination as needed.    Hand dermatitis stable continue topicals as needed.  Eucrisa  Dermatitis.  Meds in grid.  Skin care instructions reviewed.  [place] use of emollients.  Pathophysiology reviewed.  Consider Contac allergy in differential.  Consider patch testing.  Patient will let us know how  they are doing over the next several weeks.  Await clinical response to above therapies.  More psoriasiform dermatitis at left axilla may use the hydrocortisone, ketoconazole combination    Seborrheic dermatitis scalp continue hydrocortisone ketoconazole as needed, intertrigo managed as above,      Lesion of concern left nipple waxy tan papule reassurance regarding benign seborrheic keratosis consider cryo if this becomes more bothersome observe.    Multiple benign keratoses nevi on back waxy tan keratotic papules lesions in areas of concern as noted reassurance given.  Benign nature discussed.  Possibility of cryo, alphahydroxy acids over-the-counter retinol's discussed.    History of dysplastic nevus mid chest, lumbosacral back      Numerous benign lesions on the back  Please refer to map for specific lesions.  See additional diagnoses.  Pros cons of various therapies, risks benefits discussed.Pathophysiology discussed with patient.  Therapeutic options reviewed.  See  Medications in grid.  Instructions reviewed at length.    Benign nevi, seborrheic  keratoses, cherry angiomas:  Reassurance regarding other benign skin lesions.Signs and symptoms of skin cancer, ABCDE's of melanoma discussed with patient. Sunscreen use, sun protection, self exams reviewed.  Followup as noted RTC routine checkup 6 mos - one year or p.r.n.    Encounter Times Including precharting, reviewing chart, prior notes obtaining history: 10 minutes, medical exam :10 minutes, notes on body map, plan, counseling 10minutes My total time spent caring for the patient on the day of the encounter: 30 minutes     The patient indicates understanding of these issues and agrees to the plan.  The patient is asked to return as noted in follow-up/ above.    This note was generated using Dragon voice recognition software.  Please contact me regarding any confusion resulting from errors in recognition.

## 2024-12-09 NOTE — TELEPHONE ENCOUNTER
Refill Request for medication(s):     Last Office Visit: 06/12/2024    Last Refill: 06/12/2024    Pharmacy, Dosage verified: yes    Condition Update (if applicable):     Rx pended and sent to provider for approval, please advise. Thank You!

## 2024-12-10 RX ORDER — HYDROCORTISONE 25 MG/G
1 CREAM TOPICAL 2 TIMES DAILY
Qty: 28.35 G | Refills: 6 | Status: SHIPPED | OUTPATIENT
Start: 2024-12-10

## 2024-12-10 RX ORDER — KETOCONAZOLE 20 MG/G
CREAM TOPICAL
Qty: 30 G | Refills: 6 | Status: SHIPPED | OUTPATIENT
Start: 2024-12-10

## 2024-12-10 NOTE — TELEPHONE ENCOUNTER
Refill sent for both ketoconazole and hydrocortisone update appreciated.  Follow-up in June as planned

## 2025-06-13 ENCOUNTER — OFFICE VISIT (OUTPATIENT)
Dept: DERMATOLOGY CLINIC | Facility: CLINIC | Age: 75
End: 2025-06-13
Payer: MEDICARE

## 2025-06-13 DIAGNOSIS — Z86.018 HISTORY OF DYSPLASTIC NEVUS: ICD-10-CM

## 2025-06-13 DIAGNOSIS — L57.0 ACTINIC KERATOSIS: Primary | ICD-10-CM

## 2025-06-13 DIAGNOSIS — D22.9 MULTIPLE NEVI: ICD-10-CM

## 2025-06-13 DIAGNOSIS — L81.4 SOLAR LENTIGO: ICD-10-CM

## 2025-06-13 DIAGNOSIS — Z13.89 ENCOUNTER FOR SURVEILLANCE OF ABNORMAL NEVI: ICD-10-CM

## 2025-06-13 DIAGNOSIS — L82.1 SEBORRHEIC KERATOSES: ICD-10-CM

## 2025-06-13 DIAGNOSIS — L30.4 INTERTRIGO: ICD-10-CM

## 2025-06-13 DIAGNOSIS — D23.9 BENIGN NEOPLASM OF SKIN, UNSPECIFIED LOCATION: ICD-10-CM

## 2025-06-13 NOTE — PROGRESS NOTES
The following individual(s) verbally consented to be recorded using ambient AI listening technology and understand that they can each withdraw their consent to this listening technology at any point by asking the clinician to turn off or pause the recording:    Patient name: Reinaldo Weinberg

## 2025-06-15 RX ORDER — HYDROCORTISONE 25 MG/G
1 CREAM TOPICAL 2 TIMES DAILY
Qty: 28.35 G | Refills: 6 | Status: SHIPPED | OUTPATIENT
Start: 2025-06-15

## 2025-06-15 RX ORDER — KETOCONAZOLE 20 MG/G
CREAM TOPICAL
Qty: 30 G | Refills: 6 | Status: SHIPPED | OUTPATIENT
Start: 2025-06-15

## 2025-06-16 NOTE — PROGRESS NOTES
Reinaldo Weinberg is a 74 year old male.  HPI:     CC:    Chief Complaint   Patient presents with    Full Skin Exam     LOV 6/2024. Pt present for full body skin exam. Hx of Aks and dysplastic nevus. Would like assessment of lesion on R Cheek, believed to be a wart.          Allergies:  Penicillins and Sulfa antibiotics    HISTORY:    Past Medical History:    Atypical nevus    Mild to moderate mid  chest    Calculus of kidney    per [facility]: kidney stone removed    Cellulitis and abscess of upper arm and forearm    right    Duodenal ulcer    Dysplastic nevus    lumbosacral back, Compound Dysplastic Nevus-  mild dysplasia    Family history of unspecified malignant neoplasm    prostate    High cholesterol    Osteoarthritis    Other and unspecified hyperlipidemia    Visual impairment      Past Surgical History:   Procedure Laterality Date    Colonoscopy  12/2018    Colonoscopy,diagnostic  '05    Cystoscopy,insert ureteral stent      Egd  11/2019    Large DU w bleeding, ?EoE    Special service or report      kidney stone removal    Vein ligation and stripping Right       Family History   Problem Relation Age of Onset    Cancer Father         cancer, lung -smoker    Hypertension Father     Lipids Mother       Social History     Socioeconomic History    Marital status:    Tobacco Use    Smoking status: Never    Smokeless tobacco: Never   Vaping Use    Vaping status: Never Used   Substance and Sexual Activity    Alcohol use: No     Alcohol/week: 0.0 standard drinks of alcohol    Drug use: No   Other Topics Concern    Pt has a pacemaker No    Reaction to local anesthetic No     Social Drivers of Health     Food Insecurity: No Food Insecurity (10/21/2024)    Received from [facility]    Hunger Vital Sign     Worried About Running Out of Food in the Last Year: Never true     Ran Out of Food in the Last Year: Never true   Transportation Needs: No Transportation Needs (10/21/2024)    Received  from [facility]    PRAPARE - Transportation     Lack of Transportation (Medical): No     Lack of Transportation (Non-Medical): No   Housing Stability: Unknown (10/21/2024)    Received from [facility]    Housing Stability Vital Sign     Unable to Pay for Housing in the Last Year: No        Current Outpatient Medications   Medication Sig Dispense Refill    hydrocortisone 2.5 % External Cream Apply 1 Application  topically 2 (two) times daily. APPLY TO AFFECTED AREA 28.35 g 6    ketoconazole 2 % External Cream Apply to affected area 2 times daily. 30 g 6    diclofenac 1 % External Gel       FLOWFLEX COVID-19 AG HOME TEST In Vitro Kit       Cyanocobalamin (B-12) 1000 MCG Oral Tab       Misc Natural Products (GLUCOSAMINE CHOND COMPLEX/MSM OR)       losartan 100 MG Oral Tab Take 0.5 tablets (50 mg total) by mouth daily.      Crisaborole (EUCRISA) 2 % External Ointment Apply to affected areas 2x/day 100 g 6    Coenzyme Q10 (COQ-10) 200 MG Oral Cap Take by mouth.      losartan Potassium 50 MG Oral Tab Take 1 tablet (50 mg total) by mouth daily.      Vitamin C 500 MG Oral Tab Take 1 tablet (500 mg total) by mouth daily.      Cholecalciferol (VITAMIN D) 1000 units Oral Tab Take by mouth daily.      atorvastatin 20 MG Oral Tab Take 1 tablet (20 mg total) by mouth nightly.      MULTIVITAMIN OR daily       Allergies:   Allergies   Allergen Reactions    Penicillins     Sulfa Antibiotics        Past Medical History:    Atypical nevus    Mild to moderate mid  chest    Calculus of kidney    per [facility]: kidney stone removed    Cellulitis and abscess of upper arm and forearm    right    Duodenal ulcer    Dysplastic nevus    lumbosacral back, Compound Dysplastic Nevus-  mild dysplasia    Family history of unspecified malignant neoplasm    prostate    High cholesterol    Osteoarthritis    Other and unspecified hyperlipidemia    Visual impairment     Past Surgical History:   Procedure  Laterality Date    Colonoscopy  12/2018    Colonoscopy,diagnostic  '05    Cystoscopy,insert ureteral stent      Egd  11/2019    Large DU w bleeding, ?EoE    Special service or report      kidney stone removal    Vein ligation and stripping Right      Social History     Socioeconomic History    Marital status:      Spouse name: Not on file    Number of children: Not on file    Years of education: Not on file    Highest education level: Not on file   Occupational History    Not on file   Tobacco Use    Smoking status: Never    Smokeless tobacco: Never   Vaping Use    Vaping status: Never Used   Substance and Sexual Activity    Alcohol use: No     Alcohol/week: 0.0 standard drinks of alcohol    Drug use: No    Sexual activity: Not on file   Other Topics Concern    Grew up on a farm Not Asked    History of tanning Not Asked    Outdoor occupation Not Asked    Pt has a pacemaker No    Pt has a defibrillator Not Asked    Reaction to local anesthetic No   Social History Narrative    Not on file     Social Drivers of Health     Food Insecurity: No Food Insecurity (10/21/2024)    Received from [facility]    Hunger Vital Sign     Worried About Running Out of Food in the Last Year: Never true     Ran Out of Food in the Last Year: Never true   Transportation Needs: No Transportation Needs (10/21/2024)    Received from [facility]    PRAPARE - Transportation     Lack of Transportation (Medical): No     Lack of Transportation (Non-Medical): No   Stress: Not on file   Housing Stability: Unknown (10/21/2024)    Received from [facility]    Housing Stability Vital Sign     Unable to Pay for Housing in the Last Year: No     Number of Times Moved in the Last Year: Not on file     Homeless in the Last Year: Not on file     Family History   Problem Relation Age of Onset    Cancer Father         cancer, lung -smoker    Hypertension Father     Lipids Mother        There  were no vitals filed for this visit.    HPI:    Chief Complaint   Patient presents with    Full Skin Exam     LOV 6/2024. Pt present for full body skin exam. Hx of Aks and dysplastic nevus. Would like assessment of lesion on R Cheek, believed to be a wart.        Follow-up history AK's, dysplastic nevi no particular concerns.  Hand dermatitis, intertrigo been pretty stable    Intertrigo using ketoconazole hydrocortisone with good control    Concerned lesion left nipple, lesions on back    History of Present Illness  Reinaldo Weinberg is a 74 year old male who presents for dermatological evaluation of moles and skin lesions. He is accompanied by his spouse.    He has moles on his back, including one on the lower back that appears uniform and unchanged. There is also a mole on the shoulder that will be monitored. He has noticed a few new age spots and a seborrheic keratosis on his face, which he describes as 'weird' and has an urge to pick at it.    He has a history of a rash on his arms, which he associates with doxycycline use. He experienced itching on his arms after starting doxycycline, suspecting a photosensitivity reaction. He used Claritin for itching and applied cortisone cream, which he felt was ineffective. The rash has resolved, but he remains cautious about sun exposure.    He had an infection in the nail bed of one finger, which was treated with doxycycline and a cream. The infection has resolved, but the cuticle is not fully attached yet.    No current rash on his arms and no functional issues with the keratosis on his face.        Past notes/ records and appropriate/relevant lab results including pathology and past body maps reviewed. Including outside notes/ PCP notes as appropriate. Updated and new information noted in current visit.     Patient presents with concerns above.    Patient has been in their usual state of health.      History, medications, allergies reviewed as noted.      ROS:  new  relevant systemic complaints as noted       Physical Examination:     Well-developed well-nourished patient alert oriented in no acute distress.  Exam total-body performed, including scalp, head, neck, face,nails, hair, external eyes, including conjunctival mucosa, eyelids, lips external ears, back, chest,/ breasts, axillae,  abdomen, arms, legs, palms.     Multiple light to medium brown, well marginated, uniformly pigmented, macules and papules 6 mm and less scattered on exam. pigmented lesions examined with dermoscopy benign-appearing patterns.     Waxy tannish keratotic papules scattered, cherry-red vascular papules scattered.    See map today's date for lesions noted .      Otherwise remarkable for lesions as noted on map.  See details of examination  See Assessment /Plan for additional history and physical exam also:    Assessment / plan:    No orders of the defined types were placed in this encounter.      Meds & Refills for this Visit:  Requested Prescriptions     Signed Prescriptions Disp Refills    hydrocortisone 2.5 % External Cream 28.35 g 6     Sig: Apply 1 Application  topically 2 (two) times daily. APPLY TO AFFECTED AREA    ketoconazole 2 % External Cream 30 g 6     Sig: Apply to affected area 2 times daily.         Encounter Diagnoses   Name Primary?    Actinic keratosis Yes    Multiple nevi     Seborrheic keratoses     Solar lentigo     History of dysplastic nevus     Benign neoplasm of skin, unspecified location     Encounter for surveillance of abnormal nevi     Intertrigo        See details on map.      Remarkable for:    Physical Exam  SKIN: Uniform mole on lower back. Mole on shoulder monitored. Normal moles on back. New age spots present. Stork bite on neck with dilated capillaries. Warty keratosis present. Benign keratosis with rough spots. Cuticle not attached with redness present.    Results        Assessment & Plan  Photosensitivity reaction  Photosensitivity reaction likely secondary to  doxycycline use, with symptoms of itching on the arms without visible rash. Possible exacerbation due to UVA exposure through car windows. Symptoms have resolved, indicating a transient reaction. Mineral sunscreens are recommended as he blocks a significant portion of the UVA spectrum, which penetrates window glass and can cause sun damage.  - Advise use of mineral sunscreen to block UVA rays.  - Recommend caution with sun exposure, especially during extended periods outdoors.  - Suggest use of anti-pruritic lotions like CeraVe or Aveeno for symptomatic relief.  - Advise use of diphenhydramine for itching if needed.  - Instruct to report any flare-ups for potential stronger topical treatment.    Paronychia at proximal nail fold   on the finger, previously treated with doxycycline and topical cream. Currently, the cuticle is not fully attached, and there is residual erythema.  - Monitor for signs of infection recurrence.  Continue mupirocin      Recording duration: 8 minutes      Erythematous scaling keratotic papules noted at sites noted on map  Actinic Keratoses.  Precancerous nature discussed. Sun protection, sunscreen/ blocks encouraged Lesions treated with cryo- .  Biopsy if not resolved.    Right cheek  Actinic Keratoses.  Precancerous nature discussed. Sun protection, sunscreen/ blocks encouraged .  Monitoring for new lesions.  Sun damage additional recurrent and new actinic keratoses, skin cancers may occur in areas of prior actinic keratoses, related to past sun exposure to minimize current sun exposure.  Sunscreen applied consistently regularly, reapplication and sun protection while driving recommended.    Evolving SK over right forearm monitor recheck    Areas of postinflammatory hyperpigmentation over the right lower extremity    Intertrigo stable continue ketoconazole hydrocortisone combination as needed.    Hand dermatitis stable continue topicals as needed.  Eucrisa  Dermatitis.  Meds in grid.  Skin  care instructions reviewed.  [place] use of emollients.  Pathophysiology reviewed.  Consider Contac allergy in differential.  Consider patch testing.  Patient will let us know how they are doing over the next several weeks.  Await clinical response to above therapies.  More psoriasiform dermatitis at left axilla may use the hydrocortisone, ketoconazole combination    Seborrheic dermatitis scalp continue hydrocortisone ketoconazole as needed, intertrigo managed as above,      Lesion of concern left nipple waxy tan papule reassurance regarding benign seborrheic keratosis consider cryo if this becomes more bothersome observe.    Multiple benign keratoses nevi on back waxy tan keratotic papules lesions in areas of concern as noted reassurance given.  Benign nature discussed.  Possibility of cryo, alphahydroxy acids over-the-counter retinol's discussed.    History of dysplastic nevus mid chest, lumbosacral back      Numerous benign lesions on the back  Please refer to map for specific lesions.  See additional diagnoses.  Pros cons of various therapies, risks benefits discussed.Pathophysiology discussed with patient.  Therapeutic options reviewed.  See  Medications in grid.  Instructions reviewed at length.    Benign nevi, seborrheic  keratoses, cherry angiomas:  Reassurance regarding other benign skin lesions.    Monitor for new or changing lesions. Signs and symptoms of skin cancer, ABCDE's of melanoma ( additional information available at AAD.org, skincancer.org) Encourage Sunscreen (broad-spectrum, ideally mineral-based-UVA/UVB -SPF 30 or higher) use encouraged, sun protection/sun protective clothing, self exams reviewed Followup as noted RTC ---routine checkup 6 mos -one year or p.r.n.    Encounter Times   Including precharting, reviewing chart, prior notes obtaining history: 10 minutes, medical exam :10 minutes, notes on body map, plan, counseling 10minutes My total time spent caring for the patient on the day of  the encounter: 30 minutes     The patient indicates understanding of these issues and agrees to the plan.  The patient is asked to return as noted in follow-up/ above.    This note was generated using Dragon voice recognition software.  Please contact me regarding any confusion resulting from errors in recognition..  Note to patient and family: The 21st Century Cures Act makes medical notes like these available to patients. However, be advised this is a medical document. It is intended as rlrt-rz-dina communication and monitoring of a patient's care needs. It is written in medical language and may contain abbreviations or verbiage that are unfamiliar. It may appear blunt or direct. Medical documents are intended to carry relevant information, facts as evident and the clinical opinion of the practitioner.

## 2025-08-08 ENCOUNTER — WALK IN (OUTPATIENT)
Dept: URGENT CARE | Age: 75
End: 2025-08-08
Attending: EMERGENCY MEDICINE

## 2025-08-08 VITALS
DIASTOLIC BLOOD PRESSURE: 74 MMHG | HEART RATE: 76 BPM | TEMPERATURE: 97.8 F | OXYGEN SATURATION: 95 % | SYSTOLIC BLOOD PRESSURE: 119 MMHG | RESPIRATION RATE: 18 BRPM

## 2025-08-08 DIAGNOSIS — T63.441A BEE STING, ACCIDENTAL OR UNINTENTIONAL, INITIAL ENCOUNTER: Primary | ICD-10-CM

## 2025-08-08 RX ORDER — PREDNISONE 20 MG/1
40 TABLET ORAL DAILY
Qty: 10 TABLET | Refills: 0 | Status: SHIPPED | OUTPATIENT
Start: 2025-08-08 | End: 2025-08-13

## 2025-08-08 RX ORDER — ATORVASTATIN CALCIUM 20 MG/1
20 TABLET, FILM COATED ORAL DAILY
COMMUNITY

## 2025-08-08 RX ORDER — LOSARTAN POTASSIUM 50 MG/1
50 TABLET ORAL DAILY
COMMUNITY

## 2025-08-08 RX ORDER — EPINEPHRINE 0.3 MG/.3ML
0.3 INJECTION SUBCUTANEOUS
Qty: 2 EACH | Refills: 1 | Status: SHIPPED | OUTPATIENT
Start: 2025-08-08

## 2025-08-08 ASSESSMENT — PAIN SCALES - GENERAL
PAINLEVEL_OUTOF10: 2
PAINLEVEL_OUTOF10: 0

## (undated) DIAGNOSIS — R93.89 ABNORMAL FINDING ON CT SCAN: ICD-10-CM

## (undated) DIAGNOSIS — N42.9 PROSTATE IRREGULARITY: Primary | ICD-10-CM

## (undated) DIAGNOSIS — N50.89: ICD-10-CM

## (undated) DEVICE — 9534HP TRANSPARENT DRSG W/FRAME: Brand: 3M™ TEGADERM™

## (undated) DEVICE — SOL  .9 1000ML BTL

## (undated) DEVICE — CONMED SCOPE SAVER BITE BLOCK, 20X27 MM: Brand: SCOPE SAVER

## (undated) DEVICE — FLEXIBLE YANKAUER,MEDIUM TIP, NO VACUUM CONTROL: Brand: ARGYLE

## (undated) DEVICE — X-RAY DETECTABLE SPONGES,16 PLY: Brand: VISTEC

## (undated) DEVICE — 35 ML SYRINGE REGULAR TIP: Brand: MONOJECT

## (undated) DEVICE — Device: Brand: DEFENDO AIR/WATER/SUCTION AND BIOPSY VALVE

## (undated) DEVICE — CATH GOLD PROBE HEMOGLIDE 7FR

## (undated) DEVICE — GOWN SURG AERO BLUE PERF XLG

## (undated) DEVICE — COVER SGL STRL LGHT HNDL BLU

## (undated) DEVICE — STERILE LATEX POWDER-FREE SURGICAL GLOVESWITH NITRILE COATING: Brand: PROTEXIS

## (undated) DEVICE — MAJOR GENERAL: Brand: MEDLINE INDUSTRIES, INC.

## (undated) DEVICE — LINE MNTR ADLT SET O2 INTMD

## (undated) DEVICE — Device: Brand: CUSTOM PROCEDURE KIT

## (undated) DEVICE — TRAY SRGPRP PVP IOD WT SCRB SM

## (undated) DEVICE — YANKAUER SUCTION INSTRUMENT NO CONTROL VENT, BULB TIP, CLEAR: Brand: YANKAUER

## (undated) DEVICE — SUTURE VICRYL 5-0 P-3

## (undated) DEVICE — GAUZE SPONGES: Brand: DEROYAL

## (undated) DEVICE — SUTURE VICRYL 3-0 SH

## (undated) DEVICE — REM POLYHESIVE ADULT PATIENT RETURN ELECTRODE: Brand: VALLEYLAB

## (undated) DEVICE — SUTURE SILK 0

## (undated) DEVICE — 3M™ STERI-STRIP™ REINFORCED ADHESIVE SKIN CLOSURES, R1547, 1/2 IN X 4 IN (12 MM X 100 MM), 6 STRIPS/ENVELOPE: Brand: 3M™ STERI-STRIP™

## (undated) DEVICE — SUCTION CANISTER, 3000CC,SAFELINER: Brand: DEROYAL

## (undated) NOTE — ED AVS SNAPSHOT
Priyank Tripathi   MRN: [id number]    Department:  [facility] Emergency Department   Date of Visit:  6/9/2018           Disclosure     Insurance plans vary and the physician(s) referred by the ER may not be covered by your plan.  Please contact within the next three months to obtain basic health screening including reassessment of your blood pressure.     IF THERE IS ANY CHANGE OR WORSENING OF YOUR CONDITION, CALL YOUR PRIMARY CARE PHYSICIAN AT ONCE OR RETURN IMMEDIATELY TO THE EMERGENCY DEPARTMEN

## (undated) NOTE — LETTER
[address] [address], [place]  [address]  INFORMED CONSENT FOR TRANSFUSION OF BLOOD OR BLOOD PRODUCTS  My physician has informed me of the nature, purpose, benefits and risks of transfusion for blood and blood components that ______________________________________________  (Signature of Patient)                                                            (Responsible party in case of Minor,

## (undated) NOTE — LETTER
[address], [place]  Authorization for Invasive Procedures  1.  I hereby authorize Dr. Tima Serna , my physician and whomever may be designated as the doctor's assistant, to perform the following operation and/or procedure:  Esophagogast performed for the purposes of advancing medicine, science, and/or education, provided my identity is not revealed. If the procedure has been videotaped, the physician/surgeon will obtain the original videotape.  The hospital will not be responsible for stor My signature below affirms that prior to the time of the procedure, I have explained to the patient and/or his legal representative, the risks and benefits involved in the proposed treatment and any reasonable alternative to the proposed treatment.  I have

## (undated) NOTE — LETTER
[address], [place], [place]     AUTHORIZATION FOR SURGICAL OPERATION OR PROCEDURE    1.  I hereby authorize Dr. Cassy Aly MD, my Physician(s) and whomever may be designated as the doctor's Assistant, to perform the fo own blood, or a directed donor transfusion, I will discuss this with my Physician. 5. I consent to the photographing of procedure(s) to be performed for the purposes of advancing medicine, science and/or education, provided my identity is not revealed.  If _______________________________________________________________ ____________________________  (Witness signature)                                                                                                  (Date)                                (Time)

## (undated) NOTE — LETTER
[address], [place]  Authorization for Invasive Procedures  1.  I hereby authorize Dr. Luiz Trujillo , my physician and whomever may be designated as the doctor's assistant, to perform the following operation and/or procedure:  Endoscopy on performed for the purposes of advancing medicine, science, and/or education, provided my identity is not revealed. If the procedure has been videotaped, the physician/surgeon will obtain the original videotape.  The hospital will not be responsible for stor My signature below affirms that prior to the time of the procedure, I have explained to the patient and/or his legal representative, the risks and benefits involved in the proposed treatment and any reasonable alternative to the proposed treatment.  I have

## (undated) NOTE — MR AVS SNAPSHOT
[facility] [place] [address] [address] [address]  [phone]               Thank you for choosing us for your health care visit with Joanne De La Rosa MD.  We are glad to serve you and happy to provide you with this summary of y Commonly known as:  SPECTAZOLE           hydrocortisone 2.5 % Crea   Apply 1 Application topically 2 (two) times daily. LIPITOR 40 MG Tabs   Generic drug:  atorvastatin   Take  by mouth.            MULTIVITAMIN OR   daily           VITAMIN C CR OR Eat plenty of protein, keep the fat content low Sugars:  sodas and sports drinks, candies and desserts   Eat plenty of low-fat dairy products High fat meats and dairy   Choose whole grain products Foods high in sodium   Water is best for hydration Fast curtis

## (undated) NOTE — LETTER
[facility]  Administration of Anesthesia  1. I, Vikash Eckert, or _________________________________ acting on his behalf, (Patient) (Dependent/Representative) request to receive anesthesia for my pending procedure/operation/treatment. infections, high spinal block, spinal bleeding, seizure, cardiac arrest and death. 7. AWARENESS: I understand that it is possible (but unlikely) to have explicit memory of events from the operating room while under general anesthesia.   8. ELECTROCONVULSIV unconscious pt /Relationship    My signature below affirms that prior to the time of the procedure, I have explained to the patient and/or his/her guardian, the risks and benefits of undergoing anesthesia, as well as any reasonable alternatives.     _______

## (undated) NOTE — LETTER
[address], [place], [place]     AUTHORIZATION FOR SURGICAL OPERATION OR PROCEDURE    1.  I hereby authorize Dr. Maxim Starks MD, my Physician(s) and whomever may be designated as the doctor's Assistant, to perform the fo own blood, or a directed donor transfusion, I will discuss this with my Physician. 5. I consent to the photographing of procedure(s) to be performed for the purposes of advancing medicine, science and/or education, provided my identity is not revealed.  If _______________________________________________________________ ____________________________  (Witness signature)                                                                                                  (Date)                                (Time)

## (undated) NOTE — LETTER
[facility]  Administration of Anesthesia  1. I, Arvin Davenport, or _________________________________ acting on his behalf, (Patient) (Dependent/Representative) request to receive anesthesia for my pending procedure/operation/treatment. infections, high spinal block, spinal bleeding, seizure, cardiac arrest and death. 7. AWARENESS: I understand that it is possible (but unlikely) to have explicit memory of events from the operating room while under general anesthesia.   8. ELECTROCONVULSIV unconscious pt /Relationship    My signature below affirms that prior to the time of the procedure, I have explained to the patient and/or his/her guardian, the risks and benefits of undergoing anesthesia, as well as any reasonable alternatives.     _______

## (undated) NOTE — LETTER
[facility] [address], [place], [place]  [address]  INFORMED CONSENT FOR TRANSFUSION OF BLOOD OR BLOOD PRODUCTS   My physician has informed me of the nature, purpose, benefits and risks of transfusion for blood and blood components sebastián (Signature of Patient)                                                           (Responsible party in case of Minor,                                                                                                Incompetent, or unconscious Patient)  _____

## (undated) NOTE — LETTER
[facility]  Administration of Anesthesia  1. I, Abraham Galvan, or _________________________________ acting on his behalf, (Patient) (Dependent/Representative) request to receive anesthesia for my pending procedure/operation/treatment. infections, high spinal block, spinal bleeding, seizure, cardiac arrest and death. 7. AWARENESS: I understand that it is possible (but unlikely) to have explicit memory of events from the operating room while under general anesthesia.   8. ELECTROCONVULSIV unconscious pt /Relationship    My signature below affirms that prior to the time of the procedure, I have explained to the patient and/or his/her guardian, the risks and benefits of undergoing anesthesia, as well as any reasonable alternatives.     _______

## (undated) NOTE — LETTER
Hospital Discharge Documentation  Please phone to schedule a hospital follow up appointment.     From: [address] Hospitalist's Office  Phone: [phone]    Patient discharged time/date: 11/10/2019  2:45 PM  Patient discharge disposition:  Home or Rita Anemia, unspecified type[MK. 2]      Reason for Admission: GI bleed     Physical Exam:   General appearance: alert, appears stated age and cooperative  Pulmonary:  clear to auscultation bilaterally  Cardiovascular: S1, S2 normal, no murmur, click, rub or year was 15. His platelet count was 456,980. Glucose was 121, sodium 142, potassium 4.9, chloride 109, CO2 of 29, BUN of 44 with a creatinine of 0.74. Anion gap was 4.   The patient was typed and screened in the emergency room and admitted to the PCU for 6. DVT prophylaxis. SCDs. No anticoagulation given GI bleeding.       Consultations: Dr Swathi Medrano     Procedures: EGD     Complications: none    Discharge Condition: Good    Discharge Medications:[MK.1]      Discharge Medications      START taking these m Electronically signed by Helen Hernandez DO on 11/11/2019  3:02 PM   Attribution Xiong     [address] - Helen Hernandez DO on 11/11/2019  2:58 PM   [address] - Helen Hernandez DO on 11/11/2019  2:59 PM